# Patient Record
Sex: FEMALE | Race: WHITE | Employment: OTHER | URBAN - METROPOLITAN AREA
[De-identification: names, ages, dates, MRNs, and addresses within clinical notes are randomized per-mention and may not be internally consistent; named-entity substitution may affect disease eponyms.]

---

## 2017-06-01 ENCOUNTER — OFFICE VISIT (OUTPATIENT)
Dept: OPHTHALMOLOGY | Facility: CLINIC | Age: 72
End: 2017-06-01
Payer: COMMERCIAL

## 2017-06-01 DIAGNOSIS — H35.30 MACULAR DEGENERATION: ICD-10-CM

## 2017-06-01 DIAGNOSIS — D31.31 CHOROIDAL NEVUS OF RIGHT EYE: ICD-10-CM

## 2017-06-01 DIAGNOSIS — H26.9 CATARACT: Primary | ICD-10-CM

## 2017-06-01 DIAGNOSIS — Z98.890 HISTORY OF BLEPHAROPLASTY: ICD-10-CM

## 2017-06-01 DIAGNOSIS — H43.811 POSTERIOR VITREOUS DETACHMENT, RIGHT: ICD-10-CM

## 2017-06-01 DIAGNOSIS — H52.4 PRESBYOPIA: ICD-10-CM

## 2017-06-01 PROCEDURE — 92014 COMPRE OPH EXAM EST PT 1/>: CPT | Performed by: OPHTHALMOLOGY

## 2017-06-01 PROCEDURE — 92015 DETERMINE REFRACTIVE STATE: CPT | Performed by: OPHTHALMOLOGY

## 2017-06-01 ASSESSMENT — TONOMETRY
OS_IOP_MMHG: 15
IOP_METHOD: APPLANATION
OD_IOP_MMHG: 15

## 2017-06-01 ASSESSMENT — REFRACTION_WEARINGRX
OD_ADD: +2.75
OD_CYLINDER: +1.00
OS_AXIS: 150
OS_CYLINDER: +1.25
OD_SPHERE: -0.25
SPECS_TYPE: PAL
OS_ADD: +2.75
OD_AXIS: 180
OS_SPHERE: -0.50

## 2017-06-01 ASSESSMENT — REFRACTION_MANIFEST
OS_ADD: +3.00
OD_SPHERE: -0.25
OS_CYLINDER: +1.50
OS_AXIS: 155
OD_AXIS: 180
OD_CYLINDER: +1.00
OD_ADD: +3.00
OS_SPHERE: -0.50

## 2017-06-01 ASSESSMENT — CUP TO DISC RATIO
OD_RATIO: 0.4
OS_RATIO: 0.3

## 2017-06-01 ASSESSMENT — VISUAL ACUITY
OS_CC: 20/20
OS_CC: J1
METHOD: SNELLEN - LINEAR
OD_CC: J1
OD_CC: 20/20

## 2017-06-01 ASSESSMENT — CONF VISUAL FIELD
OD_NORMAL: 1
OS_NORMAL: 1

## 2017-06-01 ASSESSMENT — SLIT LAMP EXAM - LIDS
COMMENTS: GOOD CREASE, COSMESIS, 1+ MEIBOMIAN GLAND DYSFUNCTION
COMMENTS: GOOD CREASE, COSMESIS, 1+ MEIBOMIAN GLAND DYSFUNCTION

## 2017-06-01 ASSESSMENT — EXTERNAL EXAM - RIGHT EYE: OD_EXAM: NORMAL

## 2017-06-01 ASSESSMENT — EXTERNAL EXAM - LEFT EYE: OS_EXAM: NORMAL

## 2017-06-01 NOTE — PATIENT INSTRUCTIONS
"Glasses Rx given - optional  Possible posterior vitreous detachment (sudden onset large floater and/or flashing lights) left eye discussed.  Take a multiple vitamin or \"eye vitamin\" daily.  Protect your eyes outdoors from ultraviolet rays with sunglasses and/or brimmed hat.  Have spinach (cooked or raw), colorful fruits, walnuts, hazelnuts, almonds in your diet.  Monitor the vision in each eye weekly - call if any sudden persistent changes.  Call in February 2018 for an appointment in June 2018 for Complete Exam    Dr. Thao [phone]    "

## 2017-06-01 NOTE — MR AVS SNAPSHOT
"              After Visit Summary   6/1/2017    Edwardo RYAN    MRN: [id number]           Patient Information     Date Of Birth          1945        Visit Information        Provider Department      6/1/2017 1:30 PM Kee Thao MD [facility]        Today's Diagnoses     Presbyopia    -  1      Care Instructions    Glasses Rx given - optional  Possible posterior vitreous detachment (sudden onset large floater and/or flashing lights) left eye discussed.  Take a multiple vitamin or \"eye vitamin\" daily.  Protect your eyes outdoors from ultraviolet rays with sunglasses and/or brimmed hat.  Have spinach (cooked or raw), colorful fruits, walnuts, hazelnuts, almonds in your diet.  Monitor the vision in each eye weekly - call if any sudden persistent changes.  Call in February 2018 for an appointment in June 2018 for Complete Exam    Dr. Thao [phone]            Follow-ups after your visit        Who to contact     If you have questions or need follow up information about today's clinic visit or your schedule please contact [facility] directly at [phone].  Normal or non-critical lab and imaging results will be communicated to you by MyChart, letter or phone within 4 business days after the clinic has received the results. If you do not hear from us within 7 days, please contact the clinic through [employer] or phone. If you have a critical or abnormal lab result, we will notify you by phone as soon as possible.  Submit refill requests through [employer] or call your pharmacy and they will forward the refill request to us. Please allow 3 business days for your refill to be completed.          Additional Information About Your Visit        MyChart Information     [employer] lets you send messages to your doctor, view your test results, renew your prescriptions, schedule appointments and more. To sign up, go to [email or web address] . Click on \"Log in\" on the left side of " "the screen, which will take you to the Welcome page. Then click on \"Sign up Now\" on the right side of the page.     You will be asked to enter the access code listed below, as well as some personal information. Please follow the directions to create your username and password.     Your access code is: [email or web address]  Expires: 2017  3:00 PM     Your access code will  in 90 days. If you need help or a new code, please call your [place] clinic or [phone].        Care EveryWhere ID     This is your Care EveryWhere ID. This could be used by other organizations to access your [place] medical records  [id number]         Blood Pressure from Last 3 Encounters:   No data found for BP    Weight from Last 3 Encounters:   No data found for Wt              Today, you had the following     No orders found for display       Primary Care Provider Office Phone # Fax #    [facility] [phone]       [facility] [address]        Thank you!     Thank you for choosing [facility]  for your care. Our goal is always to provide you with excellent care. Hearing back from our patients is one way we can continue to improve our services. Please take a few minutes to complete the written survey that you may receive in the mail after your visit with us. Thank you!             Your Updated Medication List - Protect others around you: Learn how to safely use, store and throw away your medicines at www.disposemymeds.org.          This list is accurate as of: 17  3:00 PM.  Always use your most recent med list.                   Brand Name Dispense Instructions for use    aspirin 81 MG tablet      Take 1 tablet by mouth daily       calcium 500 +D 500-400 MG-UNIT Tabs   Generic drug:  Calcium Carb-Cholecalciferol      Take 1 tablet by mouth once       Fish Oil 500 MG Caps      Take 1 tablet by mouth once       MULTIPLE vitamin  S/WOMENS Tabs      Take 1 tablet by " mouth once       sulfacetamide-prednisoLONE 10-0.23 % ophthalmic solution    VASOCIDIN    5 mL    Place 1 drop into both eyes 2 times daily as needed

## 2017-06-01 NOTE — PROGRESS NOTES
" Current Eye Medications:  no     Subjective:  Comprehensive eye exam.   Pt reports she thinks her cataract may be getting worse in her right eye , cannot see street signs nearly as good in this eye as her left eye.    Gave names of DH and TS if desires switch to [facility].     Objective:  See Ophthalmology Exam.       Assessment:  Stable eye exam.      ICD-10-CM    1. Cataract, mild, ou H26.9 EYE EXAM (SIMPLE-NONBILLABLE)   2. 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 Macular degeneration, dry, mild, ou H35.30    4. History of blepharoplasty, upper lids, ou Z98.890    5. Posterior vitreous detachment, right H43.811    6. Presbyopia H52.4 REFRACTIVE STATUS        Plan: Glasses Rx given - optional  Possible posterior vitreous detachment (sudden onset large floater and/or flashing lights) left eye discussed.  Take a multiple vitamin or \"eye vitamin\" daily.  Protect your eyes outdoors from ultraviolet rays with sunglasses and/or brimmed hat.  Have spinach (cooked or raw), colorful fruits, walnuts, hazelnuts, almonds in your diet.  Monitor the vision in each eye weekly - call if any sudden persistent changes.  Call in February 2018 for an appointment in June 2018 for Complete Exam    Dr. Thao [phone]      "

## 2017-06-03 PROBLEM — H35.30 MACULAR DEGENERATION: Status: ACTIVE | Noted: 2017-06-03

## 2018-06-05 ENCOUNTER — OFFICE VISIT (OUTPATIENT)
Dept: OPHTHALMOLOGY | Facility: CLINIC | Age: 73
End: 2018-06-05
Payer: COMMERCIAL

## 2018-06-05 DIAGNOSIS — H52.4 PRESBYOPIA: ICD-10-CM

## 2018-06-05 DIAGNOSIS — H43.811 POSTERIOR VITREOUS DETACHMENT, RIGHT: ICD-10-CM

## 2018-06-05 DIAGNOSIS — Z83.518 FAMILY HISTORY OF MACULAR DEGENERATION: ICD-10-CM

## 2018-06-05 DIAGNOSIS — H35.30 MACULAR DEGENERATION: ICD-10-CM

## 2018-06-05 DIAGNOSIS — Z98.890 HISTORY OF BLEPHAROPLASTY: ICD-10-CM

## 2018-06-05 DIAGNOSIS — D31.31 CHOROIDAL NEVUS OF RIGHT EYE: ICD-10-CM

## 2018-06-05 DIAGNOSIS — H02.889 MGD (MEIBOMIAN GLAND DYSFUNCTION): ICD-10-CM

## 2018-06-05 DIAGNOSIS — H25.813 COMBINED FORMS OF AGE-RELATED CATARACT OF BOTH EYES: Primary | ICD-10-CM

## 2018-06-05 PROCEDURE — 92015 DETERMINE REFRACTIVE STATE: CPT | Performed by: OPHTHALMOLOGY

## 2018-06-05 PROCEDURE — 92014 COMPRE OPH EXAM EST PT 1/>: CPT | Performed by: OPHTHALMOLOGY

## 2018-06-05 RX ORDER — CLOBETASOL PROPIONATE 0.5 MG/G
OINTMENT TOPICAL 2 TIMES DAILY
COMMUNITY
End: 2023-11-15

## 2018-06-05 ASSESSMENT — REFRACTION_WEARINGRX
OD_CYLINDER: +1.00
OS_AXIS: 160
OS_ADD: +3.00
OS_SPHERE: -0.50
OS_CYLINDER: +1.50
OD_SPHERE: -0.25
OD_ADD: +3.00
OD_AXIS: 003
SPECS_TYPE: PAL

## 2018-06-05 ASSESSMENT — CONF VISUAL FIELD
OD_NORMAL: 1
OS_NORMAL: 1

## 2018-06-05 ASSESSMENT — CUP TO DISC RATIO
OD_RATIO: 0.4
OS_RATIO: 0.3

## 2018-06-05 ASSESSMENT — REFRACTION_MANIFEST
OS_SPHERE: -0.25
OS_ADD: +3.00
OD_AXIS: 003
OD_CYLINDER: +1.25
OS_AXIS: 153
OD_ADD: +3.00
OS_CYLINDER: +1.00
OD_SPHERE: PLANO

## 2018-06-05 ASSESSMENT — VISUAL ACUITY
OD_CC: 8
OS_CC+: -1
OS_PH_CC: 40-1
OD_PH_CC: 20/40
OS_CC: 4
OD_CC+: -1
OS_CC: 20/50
METHOD: SNELLEN - LINEAR
CORRECTION_TYPE: GLASSES
OD_CC: 20/50

## 2018-06-05 ASSESSMENT — TONOMETRY
IOP_METHOD: APPLANATION
OS_IOP_MMHG: 14
OD_IOP_MMHG: 14

## 2018-06-05 ASSESSMENT — EXTERNAL EXAM - RIGHT EYE: OD_EXAM: NORMAL

## 2018-06-05 ASSESSMENT — EXTERNAL EXAM - LEFT EYE: OS_EXAM: NORMAL

## 2018-06-05 NOTE — PROGRESS NOTES
" Current Eye Medications:  None.      Subjective:  Comprehensive Eye Exam.  She feels her vision in her right eye is decreasing compared to her left eye.  When driving, she is unable to see the road signs with her right eye.       Objective:  See Ophthalmology Exam.       Assessment:  Mild worsening of cataract right eye.  Stable mild dry macular changes both eyes.      ICD-10-CM    1. Combined forms of age-related cataract, mild, of both eyes H25.813 EYE EXAM (SIMPLE-NONBILLABLE)   2. Macular degeneration, dry, mild, ou H35.30    3. MGD (meibomian gland dysfunction) H02.89    4. 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 Family history of macular degeneration Z83.518    6. History of blepharoplasty, upper lids, ou Z98.890    7. Posterior vitreous detachment, right H43.811    8. Presbyopia H52.4 REFRACTIVE STATUS        Plan:  Glasses Rx given - optional.  Take a multiple vitamin or \"eye vitamin\" daily.  Alternate every other day with one AREDS 2.  Protect your eyes outdoors from ultraviolet rays with sunglasses and/or brimmed hat.  Have spinach (cooked or raw), colorful fruits, walnuts, hazelnuts, almonds in your diet.  Monitor the vision in each eye weekly - call if any sudden persistent changes.  Possible posterior vitreous detachment (sudden onset large floater and/or flashing lights) left eye discussed.  Call in February 2019 for an appointment in June 2019 for Complete Exam    Dr. Thao [phone]             "

## 2018-06-05 NOTE — MR AVS SNAPSHOT
"              After Visit Summary   6/5/2018    Edwardo RYAN    MRN: [id number]           Patient Information     Date Of Birth          1945        Visit Information        Provider Department      6/5/2018 1:00 PM Kee Thao [facility]        Today's Diagnoses     Presbyopia    -  1      Care Instructions    Glasses Rx given - optional.  Take a multiple vitamin or \"eye vitamin\" daily.  Alternate every other day with one AREDS 2.  Protect your eyes outdoors from ultraviolet rays with sunglasses and/or brimmed hat.  Have spinach (cooked or raw), colorful fruits, walnuts, hazelnuts, almonds in your diet.  Monitor the vision in each eye weekly - call if any sudden persistent changes.  Possible posterior vitreous detachment (sudden onset large floater and/or flashing lights) left eye discussed.  Call in February 2019 for an appointment in June 2019 for Complete Exam    Dr. Thao [phone]    Kee Thao M.D.  [phone]            Follow-ups after your visit        Who to contact     If you have questions or need follow up information about today's clinic visit or your schedule please contact [facility] directly at [phone].  Normal or non-critical lab and imaging results will be communicated to you by MyChart, letter or phone within 4 business days after the clinic has received the results. If you do not hear from us within 7 days, please contact the clinic through MyChart or phone. If you have a critical or abnormal lab result, we will notify you by phone as soon as possible.  Submit refill requests through [employer] or call your pharmacy and they will forward the refill request to us. Please allow 3 business days for your refill to be completed.          Additional Information About Your Visit        Care EveryWhere ID     This is your Care EveryWhere ID. This could be used by other organizations to access your [place] medical " records  [id number]         Blood Pressure from Last 3 Encounters:   No data found for BP    Weight from Last 3 Encounters:   No data found for Wt              Today, you had the following     No orders found for display       Primary Care Provider Office Phone # Fax #    Joanna Gamboa [phone]       [facility] [address] [place] [address]        Equal Access to Services     [facility]: Hadii aad ku hadasho Soomaali, waaxda luqadaha, qaybta kaalmada adeegyada, waxay idiin hayaan adeeg kharash la'aan ah. So [facility] [phone].    ATENCIÓN: Si habla espedouard, tiene a markham disposición servicios gratuitos de asistencia lingüística. Llame al [phone].    We comply with applicable federal civil rights laws and [place] laws. We do not discriminate on the basis of race, color, national origin, age, disability, sex, sexual orientation, or gender identity.            Thank you!     Thank you for choosing [facility]  for your care. Our goal is always to provide you with excellent care. Hearing back from our patients is one way we can continue to improve our services. Please take a few minutes to complete the written survey that you may receive in the mail after your visit with us. Thank you!             Your Updated Medication List - Protect others around you: Learn how to safely use, store and throw away your medicines at www.disposemymeds.org.          This list is accurate as of 6/5/18  2:24 PM.  Always use your most recent med list.                   Brand Name Dispense Instructions for use Diagnosis    clobetasol 0.05 % ointment    TEMOVATE     Apply topically 2 times daily        Fish Oil 500 MG Caps      Take 1 tablet by mouth once        MULTIPLE vitamin  S/WOMENS Tabs      Take 1 tablet by mouth once

## 2018-06-05 NOTE — LETTER
"    6/5/2018         RE: Edwardo RYAN  [address] [facility] [address]        Dear Colleague,    Thank you for referring your patient, Edwardo RYAN, to the [facility]. Please see a copy of my visit note below.     Current Eye Medications:  None.      Subjective:  Comprehensive Eye Exam.  She feels her vision in her right eye is decreasing compared to her left eye.  When driving, she is unable to see the road signs with her right eye.       Objective:  See Ophthalmology Exam.       Assessment:  Mild worsening of cataract right eye.  Stable mild dry macular changes both eyes.      ICD-10-CM    1. Combined forms of age-related cataract, mild, of both eyes H25.813 EYE EXAM (SIMPLE-NONBILLABLE)   2. Macular degeneration, dry, mild, ou H35.30    3. MGD (meibomian gland dysfunction) H02.89    4. 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 Family history of macular degeneration Z83.518    6. History of blepharoplasty, upper lids, ou Z98.890    7. Posterior vitreous detachment, right H43.811    8. Presbyopia H52.4 REFRACTIVE STATUS        Plan:  Glasses Rx given - optional.  Take a multiple vitamin or \"eye vitamin\" daily.  Alternate every other day with one AREDS 2.  Protect your eyes outdoors from ultraviolet rays with sunglasses and/or brimmed hat.  Have spinach (cooked or raw), colorful fruits, walnuts, hazelnuts, almonds in your diet.  Monitor the vision in each eye weekly - call if any sudden persistent changes.  Possible posterior vitreous detachment (sudden onset large floater and/or flashing lights) left eye discussed.  Call in February 2019 for an appointment in June 2019 for Complete Exam    Dr. Thao [phone]               Again, thank you for allowing me to participate in the care of your patient.        Sincerely,        Kee Thao MD    "

## 2019-02-21 ENCOUNTER — TELEPHONE (OUTPATIENT)
Dept: OPHTHALMOLOGY | Facility: CLINIC | Age: 74
End: 2019-02-21

## 2019-02-21 NOTE — TELEPHONE ENCOUNTER
Reason for Call:  Other recommendation    Detailed comments: Patient called and scheduled her CE in June but states that her right eye is getting blurry and she will be driving to [place] in march and is wondering if there is something that can be done. Please call to discuss thank you.     Phone Number Patient can be reached at: Home number on file [phone] (home) or Cell number on file:    Telephone Information:   Mobile [phone]       Best Time: any    Can we leave a detailed message on this number? YES    Call taken on 2/21/2019 at 10:32 AM by ALICIA MONTES

## 2019-02-21 NOTE — TELEPHONE ENCOUNTER
Spoke to patient and she is having blurred vision in the right eye for a few months now.  She is not having any flashes or floaters, but knows she has a cataract in the right eye.  She would like to be seen sooner than later due to driving to [place] soon and doesn't feel comfortable when she is blurry.  Will bring her in next Wed at 10:45 am for a dilated right eye check up. Told her we can just dilate the right eye and not the left due to her longer drive.  She will call if there are any other immediate problems, but comfortable with waiting until next week.

## 2019-02-27 ENCOUNTER — OFFICE VISIT (OUTPATIENT)
Dept: OPHTHALMOLOGY | Facility: CLINIC | Age: 74
End: 2019-02-27
Payer: MEDICARE

## 2019-02-27 DIAGNOSIS — D31.31 CHOROIDAL NEVUS OF RIGHT EYE: ICD-10-CM

## 2019-02-27 DIAGNOSIS — Z98.890 HISTORY OF BLEPHAROPLASTY: ICD-10-CM

## 2019-02-27 DIAGNOSIS — H35.3131 EARLY DRY STAGE NONEXUDATIVE AGE-RELATED MACULAR DEGENERATION OF BOTH EYES: ICD-10-CM

## 2019-02-27 DIAGNOSIS — H25.813 COMBINED FORMS OF AGE-RELATED CATARACT OF BOTH EYES: Primary | ICD-10-CM

## 2019-02-27 DIAGNOSIS — H43.811 POSTERIOR VITREOUS DETACHMENT, RIGHT: ICD-10-CM

## 2019-02-27 DIAGNOSIS — H02.889 MGD (MEIBOMIAN GLAND DYSFUNCTION): ICD-10-CM

## 2019-02-27 PROCEDURE — 92134 CPTRZ OPH DX IMG PST SGM RTA: CPT | Performed by: OPHTHALMOLOGY

## 2019-02-27 PROCEDURE — 92014 COMPRE OPH EXAM EST PT 1/>: CPT | Performed by: OPHTHALMOLOGY

## 2019-02-27 ASSESSMENT — VISUAL ACUITY
OS_CC: 20/25+2
OD_CC: 20/25-1
OD_BAT_HIGH: 20/40+1
OS_BAT_HIGH: 20/25
CORRECTION_TYPE: GLASSES
METHOD: SNELLEN - LINEAR

## 2019-02-27 ASSESSMENT — EXTERNAL EXAM - LEFT EYE: OS_EXAM: NORMAL

## 2019-02-27 ASSESSMENT — TONOMETRY
OS_IOP_MMHG: 18
IOP_METHOD: APPLANATION
OD_IOP_MMHG: 18

## 2019-02-27 ASSESSMENT — CUP TO DISC RATIO: OD_RATIO: 0.4

## 2019-02-27 ASSESSMENT — EXTERNAL EXAM - RIGHT EYE: OD_EXAM: NORMAL

## 2019-02-27 NOTE — PATIENT INSTRUCTIONS
"Take a multiple vitamin or \"eye vitamin\" daily (AREDS2).  Protect your eyes outdoors from ultraviolet rays with sunglasses and/or brimmed hat.  Have spinach (cooked or raw), colorful fruits, walnuts, hazelnuts, almonds in your diet.  Monitor the vision in each eye weekly - call if any sudden persistent changes.   Return visit as planned.    Kee Thao M.D.  [phone]    "

## 2019-02-27 NOTE — PROGRESS NOTES
" Current Eye Medications:  no     Subjective:  Decrease vision right eye   Pt reports she has noticed a gradual decline in her vision particularly in her right eye since saw us 6 mos ago.     Objective:  See Ophthalmology Exam.       Assessment:  Vision, cataract, and dry age related maculopathy appear stable.      ICD-10-CM    1. Combined forms of age-related cataract, mild, of both eyes H25.813    2. Early dry stage nonexudative age-related macular degeneration of both eyes H35.3131  COMPUTERIZED OPHTHALMIC IMAGING RETINA   3. 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 History of blepharoplasty, upper lids, ou Z98.890    5. MGD (meibomian gland dysfunction) H02.889    6. Posterior vitreous detachment, right H43.811         Plan:  Take a multiple vitamin or \"eye vitamin\" daily (AREDS2).  Protect your eyes outdoors from ultraviolet rays with sunglasses and/or brimmed hat.  Have spinach (cooked or raw), colorful fruits, walnuts, hazelnuts, almonds in your diet.  Monitor the vision in each eye weekly - call if any sudden persistent changes.   Return visit as planned.    Kee Thao M.D.  [phone]       "

## 2019-02-27 NOTE — LETTER
"    2/27/2019         RE: Edwardo RYAN  [address] [facility] [address]        Dear Colleague,    Thank you for referring your patient, Edwardo RYAN, to the [facility]. Please see a copy of my visit note below.     Current Eye Medications:  no     Subjective:  Decrease vision right eye   Pt reports she has noticed a gradual decline in her vision particularly in her right eye since saw us 6 mos ago.     Objective:  See Ophthalmology Exam.       Assessment:  Vision, cataract, and dry age related maculopathy appear stable.      ICD-10-CM    1. Combined forms of age-related cataract, mild, of both eyes H25.813    2. Early dry stage nonexudative age-related macular degeneration of both eyes H35.3131  COMPUTERIZED OPHTHALMIC IMAGING RETINA   3. 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 History of blepharoplasty, upper lids, ou Z98.890    5. MGD (meibomian gland dysfunction) H02.889    6. Posterior vitreous detachment, right H43.811         Plan:  Take a multiple vitamin or \"eye vitamin\" daily (AREDS2).  Protect your eyes outdoors from ultraviolet rays with sunglasses and/or brimmed hat.  Have spinach (cooked or raw), colorful fruits, walnuts, hazelnuts, almonds in your diet.  Monitor the vision in each eye weekly - call if any sudden persistent changes.   Return visit as planned.    Kee Thao M.D.  [phone]           Again, thank you for allowing me to participate in the care of your patient.        Sincerely,        Kee Thao MD    "

## 2019-02-28 PROBLEM — H35.3131 EARLY DRY STAGE NONEXUDATIVE AGE-RELATED MACULAR DEGENERATION OF BOTH EYES: Status: ACTIVE | Noted: 2019-02-28

## 2019-04-17 ENCOUNTER — DOCUMENTATION ONLY (OUTPATIENT)
Dept: OPHTHALMOLOGY | Facility: CLINIC | Age: 74
End: 2019-04-17

## 2019-06-11 ENCOUNTER — OFFICE VISIT (OUTPATIENT)
Dept: OPHTHALMOLOGY | Facility: CLINIC | Age: 74
End: 2019-06-11
Payer: MEDICARE

## 2019-06-11 DIAGNOSIS — D31.31 CHOROIDAL NEVUS OF RIGHT EYE: ICD-10-CM

## 2019-06-11 DIAGNOSIS — H02.889 MGD (MEIBOMIAN GLAND DYSFUNCTION): ICD-10-CM

## 2019-06-11 DIAGNOSIS — H52.4 PRESBYOPIA: ICD-10-CM

## 2019-06-11 DIAGNOSIS — H25.813 COMBINED FORMS OF AGE-RELATED CATARACT OF BOTH EYES: Primary | ICD-10-CM

## 2019-06-11 DIAGNOSIS — H43.811 POSTERIOR VITREOUS DETACHMENT, RIGHT: ICD-10-CM

## 2019-06-11 DIAGNOSIS — H35.3131 EARLY DRY STAGE NONEXUDATIVE AGE-RELATED MACULAR DEGENERATION OF BOTH EYES: ICD-10-CM

## 2019-06-11 DIAGNOSIS — Z98.890 HISTORY OF BLEPHAROPLASTY: ICD-10-CM

## 2019-06-11 PROCEDURE — 92015 DETERMINE REFRACTIVE STATE: CPT | Mod: GY | Performed by: OPHTHALMOLOGY

## 2019-06-11 PROCEDURE — 92014 COMPRE OPH EXAM EST PT 1/>: CPT | Performed by: OPHTHALMOLOGY

## 2019-06-11 ASSESSMENT — CUP TO DISC RATIO
OS_RATIO: 0.3
OD_RATIO: 0.4

## 2019-06-11 ASSESSMENT — REFRACTION_MANIFEST
OD_SPHERE: PLANO
OD_CYLINDER: +1.25
OS_ADD: +3.00
OD_ADD: +3.00
OS_SPHERE: PLANO
OS_CYLINDER: +1.25
OD_AXIS: 005
OS_AXIS: 167

## 2019-06-11 ASSESSMENT — TONOMETRY
IOP_METHOD: APPLANATION
OD_IOP_MMHG: 15
OS_IOP_MMHG: 12

## 2019-06-11 ASSESSMENT — VISUAL ACUITY
OD_CC: J1
OS_CC: J1
OD_CC: 20/20-1
CORRECTION_TYPE: GLASSES
OS_CC: 20/25+3
METHOD: SNELLEN - LINEAR

## 2019-06-11 ASSESSMENT — REFRACTION_WEARINGRX
OD_CYLINDER: +1.00
OD_AXIS: 003
OS_AXIS: 150
SPECS_TYPE: PAL
OS_ADD: +3.00
OD_SPHERE: PLANO
OS_CYLINDER: +1.25
OS_SPHERE: -0.25
OD_ADD: +3.00

## 2019-06-11 ASSESSMENT — CONF VISUAL FIELD
OD_NORMAL: 1
OS_NORMAL: 1

## 2019-06-11 ASSESSMENT — EXTERNAL EXAM - RIGHT EYE: OD_EXAM: NORMAL

## 2019-06-11 ASSESSMENT — EXTERNAL EXAM - LEFT EYE: OS_EXAM: NORMAL

## 2019-06-11 NOTE — LETTER
"    6/11/2019         RE: Edwardo RYAN  [address] [facility] [address]        Dear Colleague,    Thank you for referring your patient, Edwardo RYAN, to the [facility]. Please see a copy of my visit note below.     Current Eye Medications:  no     Subjective:  Comprehensive eye exam.   Patient reports is seeing well, vision seems unchanged and states eyes seem otherwise fine.      Objective:  See Ophthalmology Exam.       Assessment:  Stable eye exam.      ICD-10-CM    1. Combined forms of age-related cataract, mild, of both eyes H25.813    2. Early dry stage nonexudative age-related macular degeneration of both eyes H35.3131    3. MGD (meibomian gland dysfunction) H02.889    4. 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 History of blepharoplasty, upper lids, ou Z98.890    6. Posterior vitreous detachment, right H43.811    7. Presbyopia H52.4 REFRACTIVE STATUS     EYE EXAM (SIMPLE-NONBILLABLE)        Plan:  Possible posterior vitreous detachment (sudden onset large floater and/or flashing lights) left eye discussed.  Take a multiple vitamin or \"eye vitamin\" daily (AREDS2).  Protect your eyes outdoors from ultraviolet rays with sunglasses and/or brimmed hat.  Have spinach (cooked or raw), colorful fruits, walnuts, hazelnuts, almonds in your diet.  Monitor the vision in each eye weekly - call if any sudden persistent changes.   Glasses Rx given - optional   Call in February 2019 for an appointment in June 2020 for Complete Exam.    Dr. Thao [phone]         Again, thank you for allowing me to participate in the care of your patient.        Sincerely,        Kee Thao MD    "

## 2019-06-11 NOTE — PROGRESS NOTES
" Current Eye Medications:  no     Subjective:  Comprehensive eye exam.   Patient reports is seeing well, vision seems unchanged and states eyes seem otherwise fine.      Objective:  See Ophthalmology Exam.       Assessment:  Stable eye exam.      ICD-10-CM    1. Combined forms of age-related cataract, mild, of both eyes H25.813    2. Early dry stage nonexudative age-related macular degeneration of both eyes H35.3131    3. MGD (meibomian gland dysfunction) H02.889    4. 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 History of blepharoplasty, upper lids, ou Z98.890    6. Posterior vitreous detachment, right H43.811    7. Presbyopia H52.4 REFRACTIVE STATUS     EYE EXAM (SIMPLE-NONBILLABLE)        Plan:  Possible posterior vitreous detachment (sudden onset large floater and/or flashing lights) left eye discussed.  Take a multiple vitamin or \"eye vitamin\" daily (AREDS2).  Protect your eyes outdoors from ultraviolet rays with sunglasses and/or brimmed hat.  Have spinach (cooked or raw), colorful fruits, walnuts, hazelnuts, almonds in your diet.  Monitor the vision in each eye weekly - call if any sudden persistent changes.   Glasses Rx given - optional   Call in February 2019 for an appointment in June 2020 for Complete Exam.    Dr. Thao [phone]       "

## 2019-06-11 NOTE — PATIENT INSTRUCTIONS
"Possible posterior vitreous detachment (sudden onset large floater and/or flashing lights) left eye discussed.  Take a multiple vitamin or \"eye vitamin\" daily (AREDS2).  Protect your eyes outdoors from ultraviolet rays with sunglasses and/or brimmed hat.  Have spinach (cooked or raw), colorful fruits, walnuts, hazelnuts, almonds in your diet.  Monitor the vision in each eye weekly - call if any sudden persistent changes.   Glasses Rx given - optional   Call in February 2019 for an appointment in June 2020 for Complete Exam.    Dr. Thao [phone]  "

## 2020-01-23 ENCOUNTER — TELEPHONE (OUTPATIENT)
Dept: OPHTHALMOLOGY | Facility: CLINIC | Age: 75
End: 2020-01-23

## 2020-01-23 NOTE — TELEPHONE ENCOUNTER
Pt called back and  She stated she is now in [place] and  Reports for the last week that she has had this ache almost like a headache feeling behind her right eye for about a week , is not there all the time , however  Frequently has it and can have it for  An  Entire day. She asked if this problem should be a concern.I told her  really no really to tell without examination.I told he could be nothing serious or could be a problem I told pt since it has been  Going on for this much time she need to go to an eye care provider where she it at to have this problem  Checked out. Pt thought this was a good idea and said she will make an appt today.

## 2020-01-23 NOTE — TELEPHONE ENCOUNTER
Patient is currently in [place]. She is having a dull ache on the back of her right eye. She would like a return phone call to discuss. [phone].

## 2020-06-29 ENCOUNTER — OFFICE VISIT (OUTPATIENT)
Dept: OPHTHALMOLOGY | Facility: CLINIC | Age: 75
End: 2020-06-29
Payer: MEDICARE

## 2020-06-29 DIAGNOSIS — H25.813 COMBINED FORMS OF AGE-RELATED CATARACT OF BOTH EYES: Primary | ICD-10-CM

## 2020-06-29 DIAGNOSIS — Z83.518 FAMILY HISTORY OF MACULAR DEGENERATION: ICD-10-CM

## 2020-06-29 DIAGNOSIS — Z01.00 EXAMINATION OF EYES AND VISION: ICD-10-CM

## 2020-06-29 DIAGNOSIS — H35.3131 EARLY DRY STAGE NONEXUDATIVE AGE-RELATED MACULAR DEGENERATION OF BOTH EYES: ICD-10-CM

## 2020-06-29 DIAGNOSIS — H52.4 PRESBYOPIA: ICD-10-CM

## 2020-06-29 DIAGNOSIS — H43.811 POSTERIOR VITREOUS DETACHMENT, RIGHT: ICD-10-CM

## 2020-06-29 DIAGNOSIS — D31.31 CHOROIDAL NEVUS OF RIGHT EYE: ICD-10-CM

## 2020-06-29 DIAGNOSIS — Z98.890 HISTORY OF BLEPHAROPLASTY: ICD-10-CM

## 2020-06-29 PROCEDURE — 92014 COMPRE OPH EXAM EST PT 1/>: CPT | Performed by: OPHTHALMOLOGY

## 2020-06-29 PROCEDURE — 92015 DETERMINE REFRACTIVE STATE: CPT | Mod: GY | Performed by: OPHTHALMOLOGY

## 2020-06-29 ASSESSMENT — REFRACTION_WEARINGRX
OS_SPHERE: PLANO
OS_ADD: +3.00
OD_SPHERE: PLANO
OD_CYLINDER: +1.50
SPECS_TYPE: PAL
OD_ADD: +3.00
OD_AXIS: 020
OS_AXIS: 170
OS_CYLINDER: +1.50

## 2020-06-29 ASSESSMENT — REFRACTION_MANIFEST
OS_CYLINDER: +1.75
OD_SPHERE: PLANO
OS_AXIS: 160
OD_AXIS: 007
OS_SPHERE: -0.25
OS_ADD: +3.00
OD_CYLINDER: +1.25
OD_ADD: +3.00

## 2020-06-29 ASSESSMENT — TONOMETRY
IOP_METHOD: APPLANATION
OD_IOP_MMHG: 18
OS_IOP_MMHG: 18

## 2020-06-29 ASSESSMENT — VISUAL ACUITY
OS_CC: J2+
CORRECTION_TYPE: GLASSES, CONTACTS
OD_CC: 20/30+3
OD_CC: J1
OS_CC: 20/30+3
METHOD: SNELLEN - LINEAR

## 2020-06-29 ASSESSMENT — CONF VISUAL FIELD
OS_NORMAL: 1
OD_NORMAL: 1

## 2020-06-29 ASSESSMENT — CUP TO DISC RATIO
OS_RATIO: 0.3
OD_RATIO: 0.4

## 2020-06-29 ASSESSMENT — EXTERNAL EXAM - RIGHT EYE: OD_EXAM: NORMAL

## 2020-06-29 ASSESSMENT — EXTERNAL EXAM - LEFT EYE: OS_EXAM: NORMAL

## 2020-06-29 NOTE — PATIENT INSTRUCTIONS
"Possible posterior vitreous detachment (sudden onset large floater and/or flashing lights) left eye discussed.  Take a multiple vitamin or \"eye vitamin\" daily (AREDS2).  Protect your eyes outdoors from ultraviolet rays with sunglasses and/or brimmed hat.  Have spinach (cooked or raw), colorful fruits, walnuts, hazelnuts, almonds in your diet.  Monitor the vision in each eye weekly - call if any sudden persistent changes.   Glasses Rx given - optional   Call in February 2021 for an appointment in June 2021 for Complete Exam.     Dr. Thao [phone]  "

## 2020-06-29 NOTE — LETTER
"    6/29/2020         RE: Edwardo RYAN  [address] [facility] [address]        Dear Colleague,    Thank you for referring your patient, Edwardo RYAN, to the [facility]. Please see a copy of my visit note below.     Current Eye Medications:  Alternating  MVI with eye vit.     Subjective:  Comprehensive eye exam.   Pt reports it is getting a little harder to see, particularly at near.    Intermittent aching right eye.  Also, has right ear pain; will be seeing ENT     Objective:  See Ophthalmology Exam.       Assessment:  No obvious cause for aching right eye.  Otherwise stable eye exam.      ICD-10-CM    1. Combined forms of age-related cataract, mild, of both eyes  H25.813 EYE EXAM (SIMPLE-NONBILLABLE)   2. Early dry stage nonexudative age-related macular degeneration of both eyes  H35.3131    3. Choroidal nevus of right eye  D31.31    4. History of blepharoplasty, upper lids, ou  Z98.890    5. Family history of macular degeneration  Z83.518    6. Posterior vitreous detachment, right  H43.811    7. Examination of eyes and vision  Z01.00 REFRACTION     EYE EXAM (SIMPLE-NONBILLABLE)   8. Presbyopia  H52.4 REFRACTION        Plan:  Possible posterior vitreous detachment (sudden onset large floater and/or flashing lights) left eye discussed.  Take a multiple vitamin or \"eye vitamin\" daily (AREDS2).  Protect your eyes outdoors from ultraviolet rays with sunglasses and/or brimmed hat.  Have spinach (cooked or raw), colorful fruits, walnuts, hazelnuts, almonds in your diet.  Monitor the vision in each eye weekly - call if any sudden persistent changes.   Glasses Rx given - optional   Call in February 2021 for an appointment in June 2021 for Complete Exam.     Dr. Thao [phone]       Again, thank you for allowing me to participate in the care of your patient.        Sincerely,        Kee Thao MD    "

## 2020-06-29 NOTE — PROGRESS NOTES
" Current Eye Medications:  Alternating  MVI with eye vit.     Subjective:  Comprehensive eye exam.   Pt reports it is getting a little harder to see, particularly at near.    Intermittent aching right eye.  Also, has right ear pain; will be seeing ENT     Objective:  See Ophthalmology Exam.       Assessment:  No obvious cause for aching right eye.  Otherwise stable eye exam.      ICD-10-CM    1. Combined forms of age-related cataract, mild, of both eyes  H25.813 EYE EXAM (SIMPLE-NONBILLABLE)   2. Early dry stage nonexudative age-related macular degeneration of both eyes  H35.3131    3. Choroidal nevus of right eye  D31.31    4. History of blepharoplasty, upper lids, ou  Z98.890    5. Family history of macular degeneration  Z83.518    6. Posterior vitreous detachment, right  H43.811    7. Examination of eyes and vision  Z01.00 REFRACTION     EYE EXAM (SIMPLE-NONBILLABLE)   8. Presbyopia  H52.4 REFRACTION        Plan:  Possible posterior vitreous detachment (sudden onset large floater and/or flashing lights) left eye discussed.  Take a multiple vitamin or \"eye vitamin\" daily (AREDS2).  Protect your eyes outdoors from ultraviolet rays with sunglasses and/or brimmed hat.  Have spinach (cooked or raw), colorful fruits, walnuts, hazelnuts, almonds in your diet.  Monitor the vision in each eye weekly - call if any sudden persistent changes.   Glasses Rx given - optional   Call in February 2021 for an appointment in June 2021 for Complete Exam.     Dr. Thao [phone]     "

## 2021-05-27 ENCOUNTER — OFFICE VISIT (OUTPATIENT)
Dept: OPHTHALMOLOGY | Facility: CLINIC | Age: 76
End: 2021-05-27
Payer: MEDICARE

## 2021-05-27 DIAGNOSIS — H43.813 POSTERIOR VITREOUS DETACHMENT OF BOTH EYES: ICD-10-CM

## 2021-05-27 DIAGNOSIS — H43.812 POSTERIOR VITREOUS DETACHMENT OF LEFT EYE: Primary | ICD-10-CM

## 2021-05-27 PROCEDURE — 92012 INTRM OPH EXAM EST PATIENT: CPT | Performed by: OPHTHALMOLOGY

## 2021-05-27 ASSESSMENT — TONOMETRY
OD_IOP_MMHG: 19
IOP_METHOD: APPLANATION
OS_IOP_MMHG: 19

## 2021-05-27 ASSESSMENT — VISUAL ACUITY
OD_CC: 20/25
OD_CC+: -1
CORRECTION_TYPE: GLASSES
METHOD: SNELLEN - LINEAR
OS_CC: 20/25

## 2021-05-27 ASSESSMENT — EXTERNAL EXAM - RIGHT EYE: OD_EXAM: NORMAL

## 2021-05-27 ASSESSMENT — CUP TO DISC RATIO: OS_RATIO: 0.3

## 2021-05-27 ASSESSMENT — EXTERNAL EXAM - LEFT EYE: OS_EXAM: NORMAL

## 2021-05-27 NOTE — PATIENT INSTRUCTIONS
Signs and symptoms of retinal detachment (shower of black floaters, frequent flashing even during day, curtain over part of visual field) discussed.  Return visit as planned.  Kee Thao M.D.  [phone]

## 2021-05-27 NOTE — PROGRESS NOTES
Current Eye Medications:  Preservision every other day      Subjective:  New black stringy floater started last night in left eye across top of vision. Has history floaters right eye, but the one in left eye is different. Vision is little cloudy right eye > left eye, cataracts is getting ready. Hasn't noticed any change of vision since floaters. No flashes, or curtain effect. No eye pain or discomfort in either eye.      Objective:  See Ophthalmology Exam.       Assessment:  Probable posterior vitreous detachment left eye.      Plan:  Signs and symptoms of retinal detachment (shower of black floaters, frequent flashing even during day, curtain over part of visual field) discussed.  Return visit as planned.  Kee Thao M.D.  [phone]

## 2021-05-27 NOTE — LETTER
5/27/2021         RE: Edwardo RYAN  [address] [facility] [address]        Dear Colleague,    Thank you for referring your patient, Edwardo RYAN, to the [facility]. Please see a copy of my visit note below.     Current Eye Medications:  Preservision every other day      Subjective:  New black stringy floater started last night in left eye across top of vision. Has history floaters right eye, but the one in left eye is different. Vision is little cloudy right eye > left eye, cataracts is getting ready. Hasn't noticed any change of vision since floaters. No flashes, or curtain effect. No eye pain or discomfort in either eye.      Objective:  See Ophthalmology Exam.       Assessment:  Probable posterior vitreous detachment left eye.      Plan:  Signs and symptoms of retinal detachment (shower of black floaters, frequent flashing even during day, curtain over part of visual field) discussed.  Return visit as planned.  Kee Thao M.D.  [phone]                 Again, thank you for allowing me to participate in the care of your patient.        Sincerely,        Kee Thao MD

## 2021-05-28 PROBLEM — H43.813 POSTERIOR VITREOUS DETACHMENT OF BOTH EYES: Status: ACTIVE | Noted: 2021-05-28

## 2021-06-30 ENCOUNTER — OFFICE VISIT (OUTPATIENT)
Dept: OPHTHALMOLOGY | Facility: CLINIC | Age: 76
End: 2021-06-30
Payer: MEDICARE

## 2021-06-30 DIAGNOSIS — H52.4 PRESBYOPIA: ICD-10-CM

## 2021-06-30 DIAGNOSIS — Z98.890 HISTORY OF BLEPHAROPLASTY: ICD-10-CM

## 2021-06-30 DIAGNOSIS — H43.813 POSTERIOR VITREOUS DETACHMENT OF BOTH EYES: ICD-10-CM

## 2021-06-30 DIAGNOSIS — H02.889 MGD (MEIBOMIAN GLAND DYSFUNCTION): ICD-10-CM

## 2021-06-30 DIAGNOSIS — H35.3131 EARLY DRY STAGE NONEXUDATIVE AGE-RELATED MACULAR DEGENERATION OF BOTH EYES: ICD-10-CM

## 2021-06-30 DIAGNOSIS — H25.813 COMBINED FORMS OF AGE-RELATED CATARACT OF BOTH EYES: Primary | ICD-10-CM

## 2021-06-30 DIAGNOSIS — D31.31 CHOROIDAL NEVUS OF RIGHT EYE: ICD-10-CM

## 2021-06-30 DIAGNOSIS — Z01.01 ENCOUNTER FOR EXAMINATION OF EYES AND VISION WITH ABNORMAL FINDINGS: ICD-10-CM

## 2021-06-30 PROBLEM — M25.512 CHRONIC LEFT SHOULDER PAIN: Status: ACTIVE | Noted: 2017-06-13

## 2021-06-30 PROBLEM — Z96.611 S/P REVERSE TOTAL SHOULDER ARTHROPLASTY, RIGHT: Status: ACTIVE | Noted: 2018-08-17

## 2021-06-30 PROBLEM — G89.29 CHRONIC LEFT SHOULDER PAIN: Status: ACTIVE | Noted: 2017-06-13

## 2021-06-30 PROBLEM — K63.5 HYPERPLASTIC COLON POLYP: Status: ACTIVE | Noted: 2021-04-20

## 2021-06-30 PROBLEM — M12.811 ROTATOR CUFF ARTHROPATHY OF RIGHT SHOULDER: Status: ACTIVE | Noted: 2017-03-02

## 2021-06-30 PROCEDURE — 92014 COMPRE OPH EXAM EST PT 1/>: CPT | Performed by: OPHTHALMOLOGY

## 2021-06-30 PROCEDURE — 92015 DETERMINE REFRACTIVE STATE: CPT | Mod: GY | Performed by: OPHTHALMOLOGY

## 2021-06-30 RX ORDER — TRIAMCINOLONE ACETONIDE 1 MG/G
OINTMENT TOPICAL
COMMUNITY
Start: 2020-10-21

## 2021-06-30 RX ORDER — CEPHALEXIN 500 MG/1
500 CAPSULE ORAL
COMMUNITY
Start: 2021-06-24 | End: 2021-07-01

## 2021-06-30 RX ORDER — CITALOPRAM HYDROBROMIDE 10 MG/1
10 TABLET ORAL
COMMUNITY
Start: 2020-11-30 | End: 2023-11-15

## 2021-06-30 RX ORDER — FLUTICASONE PROPIONATE 50 MCG
2 SPRAY, SUSPENSION (ML) NASAL
COMMUNITY
Start: 2021-01-20

## 2021-06-30 RX ORDER — ATORVASTATIN CALCIUM 10 MG/1
10 TABLET, FILM COATED ORAL
COMMUNITY
Start: 2021-03-10 | End: 2023-11-15

## 2021-06-30 RX ORDER — ESTRADIOL 10 UG/1
10 INSERT VAGINAL
COMMUNITY
Start: 2020-10-22 | End: 2023-11-15

## 2021-06-30 ASSESSMENT — REFRACTION_MANIFEST
OS_AXIS: 155
OS_CYLINDER: +1.75
OD_CYLINDER: +1.25
OS_ADD: +3.00
OD_AXIS: 007
OD_SPHERE: PLANO
OD_ADD: +3.00
OS_SPHERE: -0.50

## 2021-06-30 ASSESSMENT — TONOMETRY
OS_IOP_MMHG: 15
OD_IOP_MMHG: 14
IOP_METHOD: APPLANATION

## 2021-06-30 ASSESSMENT — REFRACTION_WEARINGRX
OD_AXIS: 002
OS_SPHERE: -0.25
OD_CYLINDER: +1.25
OS_CYLINDER: +1.75
OD_ADD: +3.00
OS_AXIS: 160
SPECS_TYPE: PAL
OS_ADD: +3.00
OD_SPHERE: PLANO

## 2021-06-30 ASSESSMENT — VISUAL ACUITY
OD_CC+: -2
OS_CC: 20/25
OS_CC+: -1
OD_CC: 20/20
CORRECTION_TYPE: GLASSES
METHOD: SNELLEN - LINEAR

## 2021-06-30 ASSESSMENT — CONF VISUAL FIELD
OS_NORMAL: 1
OD_NORMAL: 1

## 2021-06-30 ASSESSMENT — EXTERNAL EXAM - RIGHT EYE: OD_EXAM: NORMAL

## 2021-06-30 ASSESSMENT — EXTERNAL EXAM - LEFT EYE: OS_EXAM: NORMAL

## 2021-06-30 ASSESSMENT — CUP TO DISC RATIO
OD_RATIO: 0.4
OS_RATIO: 0.3

## 2021-06-30 NOTE — PATIENT INSTRUCTIONS
"Glasses Rx given - optional.  Use artificial tears up to 4 times daily both eyes. (Refresh Tears, Systane Ultra/Balance, or Theratears).  Take a multiple vitamin or \"eye vitamin\" daily (AREDS2).  Could take daily or twice daily.Protect your eyes outdoors from ultraviolet rays with sunglasses and/or brimmed hat.  Have spinach (cooked or raw), colorful fruits, walnuts, hazelnuts, almonds in your diet.  Monitor the vision in each eye weekly - call if any sudden persistent changes.  Call in February 2022 for an appointment in June 2022 for Complete Exam    Dr. Thao [phone]    "

## 2021-06-30 NOTE — LETTER
"    6/30/2021         RE: Edwardo RYAN  [address] [facility] [address]        Dear Colleague,    Thank you for referring your patient, Edwardo RYAN, to the [facility]. Please see a copy of my visit note below.     Current Eye Medications: Preservision every other day, artificial tears prn both eyes     Subjective:  Here for complete eye exam today. Left eye still has the floater but it looks like a rectangle not a string anymore. Hasn't gone away since last visit.     Objective:  See Ophthalmology Exam.       Assessment:  Recent posterior vitreous detachment left eye now clearly evident on exam; otherwise stable eye exam.      ICD-10-CM    1. Combined forms of age-related cataract, mild, of both eyes  H25.813    2. Early dry stage nonexudative age-related macular degeneration of both eyes  H35.3131    3. MGD (meibomian gland dysfunction)  H02.889    4. Choroidal nevus of right eye  D31.31    5. History of blepharoplasty, upper lids, ou  Z98.890    6. Posterior vitreous detachment of both eyes  H43.813    7. Encounter for examination of eyes and vision with abnormal findings  Z01.01    8. Presbyopia  H52.4         Plan:  Glasses Rx given - optional.  Use artificial tears up to 4 times daily both eyes. (Refresh Tears, Systane Ultra/Balance, or Theratears).  Take a multiple vitamin or \"eye vitamin\" daily (AREDS2).  Could take daily or twice daily.Protect your eyes outdoors from ultraviolet rays with sunglasses and/or brimmed hat.  Have spinach (cooked or raw), colorful fruits, walnuts, hazelnuts, almonds in your diet.  Monitor the vision in each eye weekly - call if any sudden persistent changes.  Call in February 2022 for an appointment in June 2022 for Complete Exam    Dr. Thao [phone]           Again, thank you for allowing me to participate in the care of your patient.        Sincerely,        Kee Thao MD    "

## 2021-06-30 NOTE — PROGRESS NOTES
" Current Eye Medications: Preservision every other day, artificial tears prn both eyes     Subjective:  Here for complete eye exam today. Left eye still has the floater but it looks like a rectangle not a string anymore. Hasn't gone away since last visit.     Objective:  See Ophthalmology Exam.       Assessment:  Recent posterior vitreous detachment left eye now clearly evident on exam; otherwise stable eye exam.      ICD-10-CM    1. Combined forms of age-related cataract, mild, of both eyes  H25.813    2. Early dry stage nonexudative age-related macular degeneration of both eyes  H35.3131    3. MGD (meibomian gland dysfunction)  H02.889    4. Choroidal nevus of right eye  D31.31    5. History of blepharoplasty, upper lids, ou  Z98.890    6. Posterior vitreous detachment of both eyes  H43.813    7. Encounter for examination of eyes and vision with abnormal findings  Z01.01    8. Presbyopia  H52.4         Plan:  Glasses Rx given - optional.  Use artificial tears up to 4 times daily both eyes. (Refresh Tears, Systane Ultra/Balance, or Theratears).  Take a multiple vitamin or \"eye vitamin\" daily (AREDS2).  Could take daily or twice daily.Protect your eyes outdoors from ultraviolet rays with sunglasses and/or brimmed hat.  Have spinach (cooked or raw), colorful fruits, walnuts, hazelnuts, almonds in your diet.  Monitor the vision in each eye weekly - call if any sudden persistent changes.  Call in February 2022 for an appointment in June 2022 for Complete Exam    Dr. Thao [phone]       "

## 2021-07-09 ENCOUNTER — TELEPHONE (OUTPATIENT)
Dept: OPHTHALMOLOGY | Facility: CLINIC | Age: 76
End: 2021-07-09

## 2021-07-09 DIAGNOSIS — H35.3131 EARLY DRY STAGE NONEXUDATIVE AGE-RELATED MACULAR DEGENERATION OF BOTH EYES: Primary | ICD-10-CM

## 2021-07-09 NOTE — TELEPHONE ENCOUNTER
Patient is asking for a referral to see a retinal Specialist. Please return phone call to discuss. Phone:[phone].

## 2021-07-13 NOTE — TELEPHONE ENCOUNTER
Patient called back stating she was waiting for a call back still. She would like a call back to discuss this as soon as possible at: [phone].    Thank You,    Jose Kam  Patient Rep

## 2021-07-13 NOTE — TELEPHONE ENCOUNTER
Discussed with patient.  She is interested in a second opinion regarding her age related maculopathy and posterior vitreous detachments.  She will make appointment at [facility].  Referral written.  She will call with appointment time and we can fax last couple visits.  Kee Thao M.D.  [phone]

## 2021-08-02 ENCOUNTER — TELEPHONE (OUTPATIENT)
Dept: OPHTHALMOLOGY | Facility: CLINIC | Age: 76
End: 2021-08-02

## 2021-08-02 NOTE — TELEPHONE ENCOUNTER
Thalia called from [place]. She is asking for visit notes from Dr. Thao's last appointment for Macular Degeneration be faxed. FAX : [phone]. Attn: Thalia

## 2021-08-03 ENCOUNTER — TELEPHONE (OUTPATIENT)
Dept: OPHTHALMOLOGY | Facility: CLINIC | Age: 76
End: 2021-08-03

## 2021-08-17 ENCOUNTER — DOCUMENTATION ONLY (OUTPATIENT)
Dept: OPHTHALMOLOGY | Facility: CLINIC | Age: 76
End: 2021-08-17

## 2022-02-18 ENCOUNTER — TELEPHONE (OUTPATIENT)
Dept: OPHTHALMOLOGY | Facility: CLINIC | Age: 77
End: 2022-02-18
Payer: MEDICARE

## 2022-02-18 NOTE — TELEPHONE ENCOUNTER
Patient called stating she is experiencing blurred vision and is wondering what could be the cause of this. She is waiting to be booked for cataract surgery and is out of town in [place]. She would like to speak to a technician to see what she should do. She would like a call back as soon as possible at: [phone] when able.    Thank You,    Jose Kam  Patient Rep

## 2022-02-18 NOTE — TELEPHONE ENCOUNTER
Spoke with pt. Pt complains of intermittent blurry vision in right eye. Started about 2 weeks ago. Occurs mostly in the late afternoon/night. Some mild intermittent pain in right eye. I told pt to use AT's up to four times a day and to do warm compresses 1 - 2 times daily to see if that helps. Pt would like to be seen when she comes home from [place]. I made her an appt 03/03/2022.

## 2022-03-03 ENCOUNTER — OFFICE VISIT (OUTPATIENT)
Dept: OPHTHALMOLOGY | Facility: CLINIC | Age: 77
End: 2022-03-03
Payer: MEDICARE

## 2022-03-03 DIAGNOSIS — H04.123 DRY EYES: Primary | ICD-10-CM

## 2022-03-03 PROCEDURE — 92012 INTRM OPH EXAM EST PATIENT: CPT | Performed by: OPHTHALMOLOGY

## 2022-03-03 RX ORDER — TROSPIUM CHLORIDE 20 MG/1
20 TABLET, FILM COATED ORAL 2 TIMES DAILY
COMMUNITY
Start: 2022-01-14 | End: 2023-01-14

## 2022-03-03 ASSESSMENT — REFRACTION_WEARINGRX
OD_ADD: +2.50
OS_CYLINDER: +1.25
OD_CYLINDER: +1.25
OD_AXIS: 007
OD_SPHERE: PLANO
SPECS_TYPE: PAL
OS_ADD: +2.50
OS_SPHERE: PLANO
OS_AXIS: 156

## 2022-03-03 ASSESSMENT — EXTERNAL EXAM - RIGHT EYE: OD_EXAM: NORMAL

## 2022-03-03 ASSESSMENT — VISUAL ACUITY
OS_BAT_MED: 20/30-2
OD_CC: 20/30
METHOD: SNELLEN - LINEAR
OD_BAT_MED: 20/30-1
OS_BAT_HIGH: 20/40-2
OS_CC+: -1
CORRECTION_TYPE: GLASSES
OD_CC+: -1
OD_BAT_HIGH: 20/50-1
OS_CC: 20/25

## 2022-03-03 ASSESSMENT — EXTERNAL EXAM - LEFT EYE: OS_EXAM: NORMAL

## 2022-03-03 ASSESSMENT — TONOMETRY
OS_IOP_MMHG: 15
OD_IOP_MMHG: 15
IOP_METHOD: APPLANATION

## 2022-03-03 NOTE — PATIENT INSTRUCTIONS
May use artificial tears up to 4 times daily both eyes. (Refresh Tears, Systane Ultra/Balance, or Theratears)   Keep scheduled visit for complete exam.  Kee Thao M.D.  [phone]

## 2022-03-03 NOTE — PROGRESS NOTES
Current Eye Medications:  Systane four times a day both eyes. AREDS 2 twice a day. Used warm compresses and increased drops from three times a day to four times a day after calling us.     Subjective:  Intermittent blurred vision right eye since 1/21/22 while in [place], would improve after sleeping. Vision is OK both eyes. Hasn't had any blurred vision since being home for last 3 days. Still has floater left eye for last year, unchanged. No eye pain or discomfort in either eye.      Objective:  See Ophthalmology Exam.       Assessment:  Possible dry eye symptoms right eye.      Plan:  May use artificial tears up to 4 times daily both eyes. (Refresh Tears, Systane Ultra/Balance, or Theratears)   Keep scheduled visit for complete exam.  Kee Thao M.D.  [phone]

## 2022-03-03 NOTE — LETTER
3/3/2022         RE: Edwardo RYAN  [address] [facility] [address]        Dear Colleague,    Thank you for referring your patient, Edwardo RYAN, to the [facility]. Please see a copy of my visit note below.     Current Eye Medications:  Systane four times a day both eyes. AREDS 2 twice a day. Used warm compresses and increased drops from three times a day to four times a day after calling us.     Subjective:  Intermittent blurred vision right eye since 1/21/22 while in [place], would improve after sleeping. Vision is OK both eyes. Hasn't had any blurred vision since being home for last 3 days. Still has floater left eye for last year, unchanged. No eye pain or discomfort in either eye.      Objective:  See Ophthalmology Exam.       Assessment:  Possible dry eye symptoms right eye.      Plan:  May use artificial tears up to 4 times daily both eyes. (Refresh Tears, Systane Ultra/Balance, or Theratears)   Keep scheduled visit for complete exam.  Kee Thao M.D.  [phone]              Again, thank you for allowing me to participate in the care of your patient.        Sincerely,        Kee Thao MD

## 2022-03-04 PROBLEM — H04.123 DRY EYES: Status: ACTIVE | Noted: 2022-03-04

## 2022-07-06 ENCOUNTER — DOCUMENTATION ONLY (OUTPATIENT)
Dept: OPHTHALMOLOGY | Facility: CLINIC | Age: 77
End: 2022-07-06

## 2022-10-17 ENCOUNTER — OFFICE VISIT (OUTPATIENT)
Dept: OPHTHALMOLOGY | Facility: CLINIC | Age: 77
End: 2022-10-17
Payer: MEDICARE

## 2022-10-17 DIAGNOSIS — D31.31 CHOROIDAL NEVUS OF RIGHT EYE: ICD-10-CM

## 2022-10-17 DIAGNOSIS — H04.123 DRY EYES: ICD-10-CM

## 2022-10-17 DIAGNOSIS — H50.21 HYPERTROPIA OF RIGHT EYE: ICD-10-CM

## 2022-10-17 DIAGNOSIS — H35.3131 EARLY DRY STAGE NONEXUDATIVE AGE-RELATED MACULAR DEGENERATION OF BOTH EYES: ICD-10-CM

## 2022-10-17 DIAGNOSIS — Z83.518 FAMILY HISTORY OF MACULAR DEGENERATION: ICD-10-CM

## 2022-10-17 DIAGNOSIS — H43.813 POSTERIOR VITREOUS DETACHMENT OF BOTH EYES: ICD-10-CM

## 2022-10-17 DIAGNOSIS — Z01.01 ENCOUNTER FOR EXAMINATION OF EYES AND VISION WITH ABNORMAL FINDINGS: ICD-10-CM

## 2022-10-17 DIAGNOSIS — H52.4 PRESBYOPIA: ICD-10-CM

## 2022-10-17 DIAGNOSIS — Z96.1 PSEUDOPHAKIA: Primary | ICD-10-CM

## 2022-10-17 PROCEDURE — 92015 DETERMINE REFRACTIVE STATE: CPT | Mod: GY | Performed by: OPHTHALMOLOGY

## 2022-10-17 PROCEDURE — 92014 COMPRE OPH EXAM EST PT 1/>: CPT | Performed by: OPHTHALMOLOGY

## 2022-10-17 ASSESSMENT — TONOMETRY
IOP_METHOD: APPLANATION
OS_IOP_MMHG: 14
OD_IOP_MMHG: 14

## 2022-10-17 ASSESSMENT — REFRACTION_WEARINGRX
OD_CYLINDER: +0.75
OD_AXIS: 014
OD_ADD: +3.00
OS_VPRISM: 1 BI
OS_CYLINDER: +2.25
OS_AXIS: 131
OD_VPRISM: 1 BD
OS_ADD: +3.00
OD_SPHERE: -0.50
SPECS_TYPE: PAL
OS_SPHERE: -1.50

## 2022-10-17 ASSESSMENT — EXTERNAL EXAM - LEFT EYE: OS_EXAM: NORMAL

## 2022-10-17 ASSESSMENT — REFRACTION_MANIFEST
OS_CYLINDER: +2.00
OS_ADD: +3.25
OD_SPHERE: -0.75
OS_SPHERE: -1.50
OS_AXIS: 148
OD_AXIS: 020
OD_CYLINDER: +0.50
OD_ADD: +3.25

## 2022-10-17 ASSESSMENT — VISUAL ACUITY
METHOD: SNELLEN - LINEAR
OD_CC+: -1+2
OS_CC: 20/25
OD_CC: 20/30
OS_CC+: +2
CORRECTION_TYPE: GLASSES

## 2022-10-17 ASSESSMENT — CUP TO DISC RATIO
OS_RATIO: 0.3
OD_RATIO: 0.4

## 2022-10-17 ASSESSMENT — CONF VISUAL FIELD
OD_SUPERIOR_NASAL_RESTRICTION: 2
OD_SUPERIOR_TEMPORAL_RESTRICTION: 2
OD_INFERIOR_NASAL_RESTRICTION: 2
OS_INFERIOR_TEMPORAL_RESTRICTION: 0
OS_SUPERIOR_NASAL_RESTRICTION: 0
OS_INFERIOR_NASAL_RESTRICTION: 0
OS_NORMAL: 1
OS_SUPERIOR_TEMPORAL_RESTRICTION: 0
OD_INFERIOR_TEMPORAL_RESTRICTION: 2

## 2022-10-17 ASSESSMENT — REFRACTION_FINALRX: OD_VPRISM: 2 BD

## 2022-10-17 ASSESSMENT — EXTERNAL EXAM - RIGHT EYE: OD_EXAM: NORMAL

## 2022-10-17 NOTE — LETTER
"    10/17/2022         RE: Edwardo RYAN  [address] [facility] [place] [address]        Dear Colleague,    Thank you for referring your patient, Edwardo RYAN, to the [facility]. Please see a copy of my visit note below.     Current Eye Medications:  Artificial tears QID times daily both eyes, AREDS twice daily.     Subjective: here for complete eye exam today. Had cataract surgery in both eyes done in June at [place] [facility]. Now has had two pairs of glasses since and hasn't felt they are right. She went there so they could fix her astigmatism, but they told her they could not. Has a wrinkle on the right eye, went to see Retina regarding this.  Also has AMD     Had cataract surgeries both eyes with Riedel 6/2022.     Objective:  See Ophthalmology Exam.       Assessment:  New baseline eye exam both eyes after cataract surgeries in patient with right hypertropia.      ICD-10-CM    1. Pseudophakia, ou  Z96.1       2. 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 Choroidal nevus of right eye  D31.31       4. Hypertropia of right eye  H50.21       5. Family history of macular degeneration  Z83.518       6. Dry eyes  H04.123       7. Posterior vitreous detachment of both eyes  H43.813       8. Encounter for examination of eyes and vision with abnormal findings  Z01.01       9. Presbyopia  H52.4            Plan:  Glasses prescription given - optional  May use artificial tears up to four times a day (like Refresh Optive, Systane Balance, TheraTears, or generic artificial tears are ok. Avoid \"get the red out\" drops).   Possible clouding of posterior capsule both eyes discussed.   Continue taking a multiple vitamin or \"eye vitamin\" daily (AREDS2).  Protect your eyes outdoors from ultraviolet rays with sunglasses and/or brimmed hat.  Have spinach (cooked or raw), colorful fruits, walnuts, hazelnuts, almonds in your diet.  Monitor " the vision in each eye weekly - call if any sudden persistent changes.   Call in June 2023 for an appointment in October 2023 for Complete Exam    Dr. Thao [phone]          Again, thank you for allowing me to participate in the care of your patient.        Sincerely,        Kee Thao MD

## 2022-10-17 NOTE — PROGRESS NOTES
" Current Eye Medications:  Artificial tears QID times daily both eyes, AREDS twice daily.     Subjective: here for complete eye exam today. Had cataract surgery in both eyes done in June at [place] [facility]. Now has had two pairs of glasses since and hasn't felt they are right. She went there so they could fix her astigmatism, but they told her they could not. Has a wrinkle on the right eye, went to see Retina regarding this.  Also has AMD     Had cataract surgeries both eyes with Riedel 6/2022.     Objective:  See Ophthalmology Exam.       Assessment:  New baseline eye exam both eyes after cataract surgeries in patient with right hypertropia.      ICD-10-CM    1. Pseudophakia, ou  Z96.1       2. 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 Choroidal nevus of right eye  D31.31       4. Hypertropia of right eye  H50.21       5. Family history of macular degeneration  Z83.518       6. Dry eyes  H04.123       7. Posterior vitreous detachment of both eyes  H43.813       8. Encounter for examination of eyes and vision with abnormal findings  Z01.01       9. Presbyopia  H52.4            Plan:  Glasses prescription given - optional  May use artificial tears up to four times a day (like Refresh Optive, Systane Balance, TheraTears, or generic artificial tears are ok. Avoid \"get the red out\" drops).   Possible clouding of posterior capsule both eyes discussed.   Continue taking a multiple vitamin or \"eye vitamin\" daily (AREDS2).  Protect your eyes outdoors from ultraviolet rays with sunglasses and/or brimmed hat.  Have spinach (cooked or raw), colorful fruits, walnuts, hazelnuts, almonds in your diet.  Monitor the vision in each eye weekly - call if any sudden persistent changes.   Call in June 2023 for an appointment in October 2023 for Complete Exam    Dr. Thao [phone]      "

## 2022-10-17 NOTE — PATIENT INSTRUCTIONS
"Glasses prescription given - optional  May use artificial tears up to four times a day (like Refresh Optive, Systane Balance, TheraTears, or generic artificial tears are ok. Avoid \"get the red out\" drops).   Possible clouding of posterior capsule both eyes discussed.   Continue taking a multiple vitamin or \"eye vitamin\" daily (AREDS2).  Protect your eyes outdoors from ultraviolet rays with sunglasses and/or brimmed hat.  Have spinach (cooked or raw), colorful fruits, walnuts, hazelnuts, almonds in your diet.  Monitor the vision in each eye weekly - call if any sudden persistent changes.   Call in June 2023 for an appointment in October 2023 for Complete Exam    Dr. Thao [phone]   "

## 2022-10-18 PROBLEM — Z96.1 PSEUDOPHAKIA: Status: ACTIVE | Noted: 2022-10-18

## 2022-10-18 PROBLEM — H25.813 COMBINED FORMS OF AGE-RELATED CATARACT OF BOTH EYES: Status: RESOLVED | Noted: 2018-06-05 | Resolved: 2022-10-18

## 2022-12-18 NOTE — PATIENT INSTRUCTIONS
"Glasses Rx given - optional.  Take a multiple vitamin or \"eye vitamin\" daily.  Alternate every other day with one AREDS 2.  Protect your eyes outdoors from ultraviolet rays with sunglasses and/or brimmed hat.  Have spinach (cooked or raw), colorful fruits, walnuts, hazelnuts, almonds in your diet.  Monitor the vision in each eye weekly - call if any sudden persistent changes.  Possible posterior vitreous detachment (sudden onset large floater and/or flashing lights) left eye discussed.  Call in February 2019 for an appointment in June 2019 for Complete Exam    Dr. Thao [phone]      " normal...

## 2023-10-10 ENCOUNTER — TRANSFERRED RECORDS (OUTPATIENT)
Dept: HEALTH INFORMATION MANAGEMENT | Facility: CLINIC | Age: 78
End: 2023-10-10
Payer: MEDICARE

## 2023-11-15 ENCOUNTER — OFFICE VISIT (OUTPATIENT)
Dept: OPHTHALMOLOGY | Facility: CLINIC | Age: 78
End: 2023-11-15
Payer: MEDICARE

## 2023-11-15 DIAGNOSIS — H35.3131 EARLY DRY STAGE NONEXUDATIVE AGE-RELATED MACULAR DEGENERATION OF BOTH EYES: ICD-10-CM

## 2023-11-15 DIAGNOSIS — Z01.01 ENCOUNTER FOR EXAMINATION OF EYES AND VISION WITH ABNORMAL FINDINGS: ICD-10-CM

## 2023-11-15 DIAGNOSIS — D31.31 CHOROIDAL NEVUS OF RIGHT EYE: ICD-10-CM

## 2023-11-15 DIAGNOSIS — Z96.1 PSEUDOPHAKIA: Primary | ICD-10-CM

## 2023-11-15 DIAGNOSIS — H50.21 HYPERTROPIA OF RIGHT EYE: ICD-10-CM

## 2023-11-15 DIAGNOSIS — H43.813 POSTERIOR VITREOUS DETACHMENT OF BOTH EYES: ICD-10-CM

## 2023-11-15 DIAGNOSIS — Z98.890 HISTORY OF VITRECTOMY: ICD-10-CM

## 2023-11-15 DIAGNOSIS — H52.4 PRESBYOPIA: ICD-10-CM

## 2023-11-15 PROCEDURE — 92014 COMPRE OPH EXAM EST PT 1/>: CPT | Performed by: OPHTHALMOLOGY

## 2023-11-15 PROCEDURE — 92015 DETERMINE REFRACTIVE STATE: CPT | Mod: GY | Performed by: OPHTHALMOLOGY

## 2023-11-15 RX ORDER — HYDROCORTISONE ACETATE 0.5 %
1 CREAM (GRAM) TOPICAL DAILY
COMMUNITY
Start: 2021-12-06

## 2023-11-15 RX ORDER — ACETAMINOPHEN 500 MG
500 TABLET ORAL
COMMUNITY

## 2023-11-15 ASSESSMENT — REFRACTION_WEARINGRX
OD_SPHERE: -0.75
OS_SPHERE: -1.25
OD_CYLINDER: +0.50
OD_VPRISM: 2BD
OD_AXIS: 019
OS_ADD: +3.25
OS_AXIS: 148
OD_ADD: +3.25
OS_CYLINDER: +2.00
SPECS_TYPE: PAL

## 2023-11-15 ASSESSMENT — REFRACTION_MANIFEST
OD_CYLINDER: +0.75
OS_ADD: +3.00
OS_SPHERE: -1.00
OD_ADD: +3.00
OD_SPHERE: -0.75
OS_AXIS: 148
OD_AXIS: 028
OS_CYLINDER: +2.00

## 2023-11-15 ASSESSMENT — CONF VISUAL FIELD
OS_INFERIOR_NASAL_RESTRICTION: 0
OS_INFERIOR_TEMPORAL_RESTRICTION: 0
OS_NORMAL: 1
OD_INFERIOR_NASAL_RESTRICTION: 0
OS_SUPERIOR_TEMPORAL_RESTRICTION: 0
OD_SUPERIOR_NASAL_RESTRICTION: 0
OD_INFERIOR_TEMPORAL_RESTRICTION: 0
OS_SUPERIOR_NASAL_RESTRICTION: 0
OD_NORMAL: 1
OD_SUPERIOR_TEMPORAL_RESTRICTION: 0

## 2023-11-15 ASSESSMENT — VISUAL ACUITY
OS_CC: 20/20
OD_CC: 20/30
CORRECTION_TYPE: GLASSES
OS_CC: J1
OS_CC+: -1
OD_CC: J2
METHOD: SNELLEN - LINEAR

## 2023-11-15 ASSESSMENT — EXTERNAL EXAM - LEFT EYE: OS_EXAM: NORMAL

## 2023-11-15 ASSESSMENT — CUP TO DISC RATIO
OD_RATIO: 0.4
OS_RATIO: 0.3

## 2023-11-15 ASSESSMENT — TONOMETRY
OS_IOP_MMHG: 17
IOP_METHOD: APPLANATION
OD_IOP_MMHG: 19

## 2023-11-15 ASSESSMENT — REFRACTION_FINALRX: OD_VPRISM: 2BD

## 2023-11-15 ASSESSMENT — EXTERNAL EXAM - RIGHT EYE: OD_EXAM: NORMAL

## 2023-11-15 NOTE — PATIENT INSTRUCTIONS
"Glasses prescription given - optional    Possible clouding of posterior capsule both eyes discussed.     Continue to take a multiple vitamin or \"eye vitamin\" daily (AREDS2).  Protect your eyes outdoors from ultraviolet rays with sunglasses and/or brimmed hat.  Have spinach (cooked or raw), colorful fruits, walnuts, hazelnuts, almonds in your diet.  Monitor the vision in each eye weekly - call if any sudden persistent changes.     May use artificial tears up to four times a day (like Refresh Optive, Systane Balance, or TheraTears. Avoid \"get the red out\" drops and generic artifical tears).     Continue care with Dr. Adair.     Call in July 2024 for an appointment in November 2024 for Complete Exam    Dr. Thao [phone]    "

## 2023-11-15 NOTE — LETTER
"    11/15/2023         RE: Edwardo RYAN  [address] [facility] [address]        Dear Colleague,    Thank you for referring your patient, Edwardo RYAN, to the [facility]. Please see a copy of my visit note below.     Current Eye Medications:  systane both eyes four times a day.       Subjective:  Comprehensive Eye Exam.  She feels the prism needs adjusting, or the prescription has changed in her right eye, but vision is good in her left eye.  Amsler grid is less wavy since the laser surgery.    Status/Post retina surgery in September of this year, with Dr Adair.       Objective:  See Ophthalmology Exam.       Assessment:  Doing well s/p recent vitrectomy, epiretinal membrane delamination right eye.  Otherwise stable eye exam.      ICD-10-CM    1. Pseudophakia, ou  Z96.1       2. History of vitrectomy, ERM delam, od  Z98.890       3. Early dry stage nonexudative age-related macular degeneration of both eyes  H35.3131       4. 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 Hypertropia of right eye  H50.21       6. Posterior vitreous detachment of both eyes  H43.813       7. Encounter for examination of eyes and vision with abnormal findings  Z01.01       8. Presbyopia  H52.4            Plan:  Glasses prescription given - optional    Possible clouding of posterior capsule both eyes discussed.     Continue to take a multiple vitamin or \"eye vitamin\" daily (AREDS2).  Protect your eyes outdoors from ultraviolet rays with sunglasses and/or brimmed hat.  Have spinach (cooked or raw), colorful fruits, walnuts, hazelnuts, almonds in your diet.  Monitor the vision in each eye weekly - call if any sudden persistent changes.     May use artificial tears up to four times a day (like Refresh Optive, Systane Balance, or TheraTears. Avoid \"get the red out\" drops and generic artifical tears).     Continue care with Dr. Adair.     Call in July 2024 for an appointment in November " 2024 for Complete Exam    Dr. Thao [phone]         Again, thank you for allowing me to participate in the care of your patient.        Sincerely,        Kee Thao MD

## 2023-12-01 ENCOUNTER — TELEPHONE (OUTPATIENT)
Dept: OPHTHALMOLOGY | Facility: CLINIC | Age: 78
End: 2023-12-01
Payer: MEDICARE

## 2023-12-01 NOTE — TELEPHONE ENCOUNTER
Left message for patient to call back. She was just in to see Dr. Thao. Will have to review with him if she needs another appt or can just be scheduled for a laser.

## 2023-12-01 NOTE — TELEPHONE ENCOUNTER
[facility] Call Center    Phone Message    May a detailed message be left on voicemail: yes     Reason for Call: Other: Patient saw Dr Thao in November and provider informed her that we could do laser procedure on her cloudy eye. Patient would like to know if we're able to have it done at our [place] clinic and when she can some in. Patient is going to [place] 12/29 and would like this done before she leaves. Patient also want to know if it's at a point that it can be done. When she came in it wasn't so bad yet but patient is noticing the difference now.    Please call patient back at [phone]. Thank you.     Action Taken: Message routed to:  Other:  Clinic    Travel Screening: Not Applicable

## 2023-12-04 ENCOUNTER — TELEPHONE (OUTPATIENT)
Dept: OPHTHALMOLOGY | Facility: CLINIC | Age: 78
End: 2023-12-04
Payer: MEDICARE

## 2023-12-04 NOTE — TELEPHONE ENCOUNTER
Called patient and scheduled her for the YAG left eye. Wants to do the left eye first and will have to do the right eye when she gets back into [place]. Made both appointments for her, laser and s/p 1 week laser.

## 2023-12-04 NOTE — TELEPHONE ENCOUNTER
Health Call Center    Phone Message    May a detailed message be left on voicemail: yes     Reason for Call: Other: Pt is returning Keyonna's call and missed it and is requesting her to please call her back. Pt is asking if she can have the surgery by soon due to her trip to [place]. Please contact pt to discuss her options for the surgery. Thank you!      Action Taken: Message routed to:  [facility] eye    Travel Screening: Not Applicable

## 2023-12-19 ENCOUNTER — OFFICE VISIT (OUTPATIENT)
Dept: OPHTHALMOLOGY | Facility: CLINIC | Age: 78
End: 2023-12-19
Payer: MEDICARE

## 2023-12-19 DIAGNOSIS — H35.3131 EARLY DRY STAGE NONEXUDATIVE AGE-RELATED MACULAR DEGENERATION OF BOTH EYES: ICD-10-CM

## 2023-12-19 DIAGNOSIS — Z96.1 PSEUDOPHAKIA: ICD-10-CM

## 2023-12-19 DIAGNOSIS — Z98.890 HISTORY OF VITRECTOMY: ICD-10-CM

## 2023-12-19 DIAGNOSIS — H26.492 POSTERIOR CAPSULAR OPACIFICATION VISUALLY SIGNIFICANT, LEFT EYE: Primary | ICD-10-CM

## 2023-12-19 PROCEDURE — 92012 INTRM OPH EXAM EST PATIENT: CPT | Mod: 25 | Performed by: OPHTHALMOLOGY

## 2023-12-19 PROCEDURE — 66821 AFTER CATARACT LASER SURGERY: CPT | Mod: LT | Performed by: OPHTHALMOLOGY

## 2023-12-19 ASSESSMENT — TONOMETRY
OS_IOP_MMHG: 15
IOP_METHOD: APPLANATION
OD_IOP_MMHG: 15

## 2023-12-19 ASSESSMENT — VISUAL ACUITY
OS_CC+: -3
OD_CC: 20/40
METHOD: SNELLEN - LINEAR
CORRECTION_TYPE: GLASSES
OS_CC: 20/25

## 2023-12-19 ASSESSMENT — EXTERNAL EXAM - LEFT EYE: OS_EXAM: NORMAL

## 2023-12-19 ASSESSMENT — CUP TO DISC RATIO: OS_RATIO: 0.3

## 2023-12-19 ASSESSMENT — EXTERNAL EXAM - RIGHT EYE: OD_EXAM: NORMAL

## 2023-12-19 NOTE — PROGRESS NOTES
" Current Eye Medications:  AREDS2 twice a day. Systane four times a day both eyes.     Subjective:  Yag cap left eye 12/19/23. Patient prefers to have left eye done first. Vision is down both eyes in distance. Gets some double vision right eye since last year. Sometimes right eye feels dry. No eye pain in either eye.   Patient also states \"can't use new glasses from [employer], been using old ones. Explained we would check the Rx at follow up visit from yag.     Objective:  See Ophthalmology Exam.       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      Plan:  Yag Capsulotomy performed without problems left eye under Proparacaine anesthetic.  Well tolerated.  Number of applications: 14  Power of applications: 1.2 mJ  Iopidine given.    Kee Thao M.D.      "

## 2023-12-19 NOTE — LETTER
"    12/19/2023         RE: Edwardo RYAN  [address] [facility] [address]        Dear Colleague,    Thank you for referring your patient, Edwardo RYAN, to the [facility]. Please see a copy of my visit note below.     Current Eye Medications:  AREDS2 twice a day. Systane four times a day both eyes.     Subjective:  Yag cap left eye 12/19/23. Patient prefers to have left eye done first. Vision is down both eyes in distance. Gets some double vision right eye since last year. Sometimes right eye feels dry. No eye pain in either eye.   Patient also states \"can't use new glasses from [employer], been using old ones. Explained we would check the Rx at follow up visit from yag.     Objective:  See Ophthalmology Exam.       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      Plan:  Yag Capsulotomy performed without problems left eye under Proparacaine anesthetic.  Well tolerated.  Number of applications: 14  Power of applications: 1.2 mJ  Iopidine given.    Kee Thao M.D.      Again, thank you for allowing me to participate in the care of your patient.        Sincerely,        Kee Thao MD  "

## 2023-12-19 NOTE — PATIENT INSTRUCTIONS
Your eye may be slightly red, sore, and blurry for a few days.  You may notice some floaters for a few days.    Keep scheduled appointment in about 1 week for S/P YAG Cap left eye - intraocular pressure check and refraction.     Kee Thao M.D.  [phone]

## 2023-12-26 ENCOUNTER — OFFICE VISIT (OUTPATIENT)
Dept: OPHTHALMOLOGY | Facility: CLINIC | Age: 78
End: 2023-12-26
Payer: MEDICARE

## 2023-12-26 DIAGNOSIS — Z96.1 PSEUDOPHAKIA: Primary | ICD-10-CM

## 2023-12-26 PROCEDURE — 99024 POSTOP FOLLOW-UP VISIT: CPT | Performed by: OPHTHALMOLOGY

## 2023-12-26 ASSESSMENT — VISUAL ACUITY
OD_CC+: +2
OS_CC: 20/25
CORRECTION_TYPE: GLASSES
METHOD: SNELLEN - LINEAR
OD_CC: 20/50
OS_CC+: -1

## 2023-12-26 ASSESSMENT — REFRACTION_MANIFEST
OS_AXIS: 146
OS_CYLINDER: +2.25
OS_ADD: +3.00
OS_SPHERE: -1.00

## 2023-12-26 ASSESSMENT — TONOMETRY
OS_IOP_MMHG: 13
IOP_METHOD: APPLANATION

## 2023-12-26 ASSESSMENT — REFRACTION_WEARINGRX
OS_SPHERE: -0.75
OD_CYLINDER: +0.75
OS_ADD: +3.00
OS_CYLINDER: +1.75
SPECS_TYPE: PAL
OD_SPHERE: -0.75
OS_AXIS: 151
OD_ADD: +3.00
OD_VPRISM: 2BD
OD_AXIS: 025

## 2023-12-26 ASSESSMENT — EXTERNAL EXAM - RIGHT EYE: OD_EXAM: NORMAL

## 2023-12-26 ASSESSMENT — EXTERNAL EXAM - LEFT EYE: OS_EXAM: NORMAL

## 2023-12-26 ASSESSMENT — REFRACTION_FINALRX: OD_VPRISM: 2BD

## 2023-12-26 NOTE — PATIENT INSTRUCTIONS
"Glasses prescription given - optional    May use artificial tears up to four times a day (like Refresh Optive, Systane Balance, or TheraTears. Avoid \"get the red out\" drops and generic artifical tears).     Continue to take a multiple vitamin or \"eye vitamin\" daily (AREDS2).  Protect your eyes outdoors from ultraviolet rays with sunglasses and/or brimmed hat.  Have spinach (cooked or raw), colorful fruits, walnuts, hazelnuts, almonds in your diet.  Monitor the vision in each eye weekly - call if any sudden persistent changes.    Call in July 2024 for an appointment in November 2024 for Complete Exam    Dr. Thao [phone]    "

## 2023-12-26 NOTE — PROGRESS NOTES
" Current Eye Medications:  systane      Subjective:  Status/Post Yag Capsulotomy left eye:  12-19-23.  Vision is clearer since the laser.  No problems or concerns.     Hold right eye for now; not yet visually significant.      Objective:  See Ophthalmology Exam.       Assessment: Doing well s/p Yag Caps left eye.      Plan:  Glasses prescription given - optional    May use artificial tears up to four times a day (like Refresh Optive, Systane Balance, or TheraTears. Avoid \"get the red out\" drops and generic artifical tears).     Continue to take a multiple vitamin or \"eye vitamin\" daily (AREDS2).  Protect your eyes outdoors from ultraviolet rays with sunglasses and/or brimmed hat.  Have spinach (cooked or raw), colorful fruits, walnuts, hazelnuts, almonds in your diet.  Monitor the vision in each eye weekly - call if any sudden persistent changes.    Call in July 2024 for an appointment in November 2024 for Complete Exam    Dr. Thao [phone]    "

## 2023-12-26 NOTE — LETTER
"    12/26/2023         RE: Edwardo RYAN  [address] [facility] ([place], [place]) [address]        Dear Colleague,    Thank you for referring your patient, Edwardo RYAN, to the [facility]. Please see a copy of my visit note below.     Current Eye Medications:  systane      Subjective:  Status/Post Yag Capsulotomy left eye:  12-19-23.  Vision is clearer since the laser.  No problems or concerns.     Hold right eye for now; not yet visually significant.      Objective:  See Ophthalmology Exam.       Assessment: Doing well s/p Yag Caps left eye.      Plan:  Glasses prescription given - optional    May use artificial tears up to four times a day (like Refresh Optive, Systane Balance, or TheraTears. Avoid \"get the red out\" drops and generic artifical tears).     Continue to take a multiple vitamin or \"eye vitamin\" daily (AREDS2).  Protect your eyes outdoors from ultraviolet rays with sunglasses and/or brimmed hat.  Have spinach (cooked or raw), colorful fruits, walnuts, hazelnuts, almonds in your diet.  Monitor the vision in each eye weekly - call if any sudden persistent changes.    Call in July 2024 for an appointment in November 2024 for Complete Exam    Dr. Thao [phone]      Again, thank you for allowing me to participate in the care of your patient.        Sincerely,        Kee Thao MD  "

## 2024-01-11 ENCOUNTER — TELEPHONE (OUTPATIENT)
Dept: OPHTHALMOLOGY | Facility: CLINIC | Age: 79
End: 2024-01-11
Payer: MEDICARE

## 2024-01-11 NOTE — TELEPHONE ENCOUNTER
[facility] Call Center    Phone Message    May a detailed message be left on voicemail: yes     Reason for Call: Other: PT CALLED ABOUT VM LEFT FROM [facility]. OK'D ABOUT FAXING FORM WHEN PROVIDER IS IN. VERIFIED FAX NUMBER TO [employer] LOCATED IN [place] [phone]      Action Taken: Other: EYE    Travel Screening: Not Applicable

## 2024-10-03 ENCOUNTER — TELEPHONE (OUTPATIENT)
Dept: OPHTHALMOLOGY | Facility: CLINIC | Age: 79
End: 2024-10-03
Payer: MEDICARE

## 2024-10-03 NOTE — TELEPHONE ENCOUNTER
[facility] Call Center    Phone Message    May a detailed message be left on voicemail: yes     Reason for Call: Symptoms or Concerns     If patient has red-flag symptoms, warm transfer to triage line    Current symptom or concern: per pt left eye is worsening pt is requesting to be seen sooner than November, pt advised the film is worsening    Symptoms have been present for:  2 weeks    Has patient previously been seen for this? Yes    By Dr Thao:     Date: 12/26/23    Are there any new or worsening symptoms? Yes:   Appointment Intake    Referring Provider Name: N/A  Diagnosis and/or Symptoms: per pt left eye is worsening pt is requesting to be seen sooner than November, pt advised the film is worsening  Pt is wanting to be seen soon and not in November. Please contact pt to discuss options     Action Taken: Message routed to:  [facility] eye    Travel Screening: Not Applicable     Date of Service:

## 2024-10-03 NOTE — TELEPHONE ENCOUNTER
Made patient an appt for possible YAG caps right eye and a week later for post op. She says that with the right eye her vision is just blurred and the left eye has always been better. Which has always been the case.  Today saw four guys outside and there was only three.  But then had her check on the phone and no double vision at all.  She says in the end of the conversation she thinks the right eye is more blurred, so we will see her and evaluate.  A bit all over the board with her complaints today, has recently moved and been stressed.  Has been noticing a lot of trouble for the past week now.

## 2024-10-10 ENCOUNTER — OFFICE VISIT (OUTPATIENT)
Dept: OPHTHALMOLOGY | Facility: CLINIC | Age: 79
End: 2024-10-10
Payer: MEDICARE

## 2024-10-10 DIAGNOSIS — Z96.1 PSEUDOPHAKIA: Primary | ICD-10-CM

## 2024-10-10 DIAGNOSIS — H26.491 POSTERIOR CAPSULAR OPACIFICATION NON VISUALLY SIGNIFICANT OF RIGHT EYE: ICD-10-CM

## 2024-10-10 PROCEDURE — 92012 INTRM OPH EXAM EST PATIENT: CPT | Performed by: OPHTHALMOLOGY

## 2024-10-10 ASSESSMENT — REFRACTION_WEARINGRX
OD_CYLINDER: +0.50
OS_AXIS: 145
OD_ADD: +2.50
OD_SPHERE: -0.75
SPECS_TYPE: PAL
OS_CYLINDER: +2.00
OD_AXIS: 014
OD_HPRISM: 2BD
OS_ADD: +2.50
OS_SPHERE: -1.50

## 2024-10-10 ASSESSMENT — EXTERNAL EXAM - RIGHT EYE: OD_EXAM: NORMAL

## 2024-10-10 ASSESSMENT — VISUAL ACUITY
OD_CC: 20/40
CORRECTION_TYPE: GLASSES
METHOD: SNELLEN - LINEAR
OS_CC+: -2
OS_CC: 20/25
OD_CC+: -1/+1

## 2024-10-10 ASSESSMENT — EXTERNAL EXAM - LEFT EYE: OS_EXAM: NORMAL

## 2024-10-10 ASSESSMENT — CUP TO DISC RATIO: OD_RATIO: 0.4

## 2024-10-10 ASSESSMENT — TONOMETRY
OS_IOP_MMHG: 14
IOP_METHOD: APPLANATION
OD_IOP_MMHG: 17

## 2024-10-10 NOTE — PATIENT INSTRUCTIONS
"May use artificial tears up to 4 times daily both eyes. (Refresh Tears, Systane Ultra/Balance, or Theratears)   Take a multiple vitamin or \"eye vitamin\" daily (AREDS2).  Protect your eyes outdoors from ultraviolet rays with sunglasses and/or brimmed hat.  Have spinach (cooked or raw), colorful fruits, walnuts, hazelnuts, almonds in your diet.  Monitor the vision in each eye weekly - call if any sudden persistent changes.   Keep your scheduled appointment in November 2024.    Kee Thao M.D.  [phone]    "

## 2024-10-10 NOTE — LETTER
"10/10/2024      Edwardo RYAN  [address]  [address]  [place] [address]      Dear Colleague,    Thank you for referring your patient, Edwardo RYAN, to the [facility]. Please see a copy of my visit note below.     Current Eye Medications:  Systane both eyes four times per day as needed      Subjective:  Patient has been noticing it is harder to see street signs. History of double vision following cataract surgery and seems to be getting worse over the last 4 weeks. She has been noticing it more when reading or sometimes when looking out of the window. Patient says that most of the time if she closes her right eye the double vision goes away but that doesn't happen all the time.   No changes in floaters, she only has the one in her left eye. No flashing lights. No ocular pain or discomfort other than some dryness.     Objective:  See Ophthalmology Exam.       Assessment:  Posterior capsule opacity right eye not yet visually significant.      Plan:  May use artificial tears up to 4 times daily both eyes. (Refresh Tears, Systane Ultra/Balance, or Theratears)   Take a multiple vitamin or \"eye vitamin\" daily (AREDS2).  Protect your eyes outdoors from ultraviolet rays with sunglasses and/or brimmed hat.  Have spinach (cooked or raw), colorful fruits, walnuts, hazelnuts, almonds in your diet.  Monitor the vision in each eye weekly - call if any sudden persistent changes.   Keep your scheduled appointment in November 2024 - will do retinal OCT and muscle check then, too.    Kee Thao M.D.  [phone]       Again, thank you for allowing me to participate in the care of your patient.        Sincerely,        Kee Thao MD  "

## 2024-10-10 NOTE — PROGRESS NOTES
" Current Eye Medications:  Systane both eyes four times per day as needed      Subjective:  Patient has been noticing it is harder to see street signs. History of double vision following cataract surgery and seems to be getting worse over the last 4 weeks. She has been noticing it more when reading or sometimes when looking out of the window. Patient says that most of the time if she closes her right eye the double vision goes away but that doesn't happen all the time.   No changes in floaters, she only has the one in her left eye. No flashing lights. No ocular pain or discomfort other than some dryness.     Objective:  See Ophthalmology Exam.       Assessment:  Posterior capsule opacity right eye not yet visually significant.      Plan:  May use artificial tears up to 4 times daily both eyes. (Refresh Tears, Systane Ultra/Balance, or Theratears)   Take a multiple vitamin or \"eye vitamin\" daily (AREDS2).  Protect your eyes outdoors from ultraviolet rays with sunglasses and/or brimmed hat.  Have spinach (cooked or raw), colorful fruits, walnuts, hazelnuts, almonds in your diet.  Monitor the vision in each eye weekly - call if any sudden persistent changes.   Keep your scheduled appointment in November 2024 - will do retinal OCT and muscle check then, too.    Kee Thao M.D.  [phone]     "

## 2024-11-26 ENCOUNTER — OFFICE VISIT (OUTPATIENT)
Dept: OPHTHALMOLOGY | Facility: CLINIC | Age: 79
End: 2024-11-26
Payer: MEDICARE

## 2024-11-26 DIAGNOSIS — H50.21 HYPERTROPIA OF RIGHT EYE: ICD-10-CM

## 2024-11-26 DIAGNOSIS — D31.31 CHOROIDAL NEVUS OF RIGHT EYE: ICD-10-CM

## 2024-11-26 DIAGNOSIS — H43.813 POSTERIOR VITREOUS DETACHMENT OF BOTH EYES: ICD-10-CM

## 2024-11-26 DIAGNOSIS — H52.4 PRESBYOPIA: ICD-10-CM

## 2024-11-26 DIAGNOSIS — Z98.890 HISTORY OF VITRECTOMY: ICD-10-CM

## 2024-11-26 DIAGNOSIS — Z01.01 ENCOUNTER FOR EXAMINATION OF EYES AND VISION WITH ABNORMAL FINDINGS: ICD-10-CM

## 2024-11-26 DIAGNOSIS — Z96.1 PSEUDOPHAKIA: Primary | ICD-10-CM

## 2024-11-26 DIAGNOSIS — H35.3131 EARLY DRY STAGE NONEXUDATIVE AGE-RELATED MACULAR DEGENERATION OF BOTH EYES: ICD-10-CM

## 2024-11-26 PROCEDURE — 92014 COMPRE OPH EXAM EST PT 1/>: CPT | Performed by: OPHTHALMOLOGY

## 2024-11-26 PROCEDURE — 92015 DETERMINE REFRACTIVE STATE: CPT | Mod: GY | Performed by: OPHTHALMOLOGY

## 2024-11-26 PROCEDURE — 92134 CPTRZ OPH DX IMG PST SGM RTA: CPT | Performed by: OPHTHALMOLOGY

## 2024-11-26 ASSESSMENT — REFRACTION_MANIFEST
OS_AXIS: 160
OS_ADD: +3.00
OD_AXIS: 018
OS_CYLINDER: +2.00
OD_ADD: +3.00
OD_SPHERE: -1.00
OS_SPHERE: -1.00
OD_CYLINDER: +1.00

## 2024-11-26 ASSESSMENT — CONF VISUAL FIELD
OD_SUPERIOR_NASAL_RESTRICTION: 0
OS_NORMAL: 1
OD_SUPERIOR_TEMPORAL_RESTRICTION: 0
OS_SUPERIOR_TEMPORAL_RESTRICTION: 0
OS_SUPERIOR_NASAL_RESTRICTION: 0
OS_INFERIOR_TEMPORAL_RESTRICTION: 0
OD_INFERIOR_NASAL_RESTRICTION: 0
OD_NORMAL: 1
OD_INFERIOR_TEMPORAL_RESTRICTION: 0
OS_INFERIOR_NASAL_RESTRICTION: 0

## 2024-11-26 ASSESSMENT — REFRACTION_WEARINGRX
OD_SPHERE: -0.50
OS_CYLINDER: +2.00
OS_ADD: +3.25
OS_SPHERE: -1.25
OD_AXIS: 017
OD_ADD: +3.25
OD_CYLINDER: +0.50
OD_VPRISM: 1BD
OS_AXIS: 147

## 2024-11-26 ASSESSMENT — EXTERNAL EXAM - RIGHT EYE: OD_EXAM: NORMAL

## 2024-11-26 ASSESSMENT — CUP TO DISC RATIO
OS_RATIO: 0.3
OD_RATIO: 0.5

## 2024-11-26 ASSESSMENT — VISUAL ACUITY
OD_CC: J5-
METHOD: SNELLEN - LINEAR
OD_CC: 20/70
OD_PH_CC: 20/50
OS_CC+: -1
CORRECTION_TYPE: GLASSES
OS_CC: 20/30
OD_PH_CC+: -2
OD_CC+: +2
OS_CC: J2-

## 2024-11-26 ASSESSMENT — EXTERNAL EXAM - LEFT EYE: OS_EXAM: NORMAL

## 2024-11-26 ASSESSMENT — REFRACTION_FINALRX
OS_VPRISM: 3BU
OD_VPRISM: 2BD

## 2024-11-26 ASSESSMENT — TONOMETRY
OS_IOP_MMHG: 16
IOP_METHOD: APPLANATION
OD_IOP_MMHG: 14

## 2024-11-26 NOTE — PATIENT INSTRUCTIONS
"Glasses prescription given - optional    May use artificial tears up to four times a day (like Refresh Optive, Systane Balance, or TheraTears. Avoid \"get the red out\" drops and generic artifical tears).     Continue to take a multiple vitamin or \"eye vitamin\" daily (AREDS2).  Protect your eyes outdoors from ultraviolet rays with sunglasses and/or brimmed hat.  Have spinach (cooked or raw), colorful fruits, walnuts, hazelnuts, almonds in your diet.  Monitor the vision in each eye weekly - call if any sudden persistent changes.     Possible clouding of posterior capsule right eye discussed.     Call in July 2025 for an appointment in November 2025 for Complete Exam    Dr. Thao [phone]    "

## 2024-11-26 NOTE — PROGRESS NOTES
" Current Eye Medications:  systane both eyes four times a day,  AREDS2 twice a day.      Subjective:  Comprehensive Eye Exam.  The patient complains of complains of the inability of her eyes to focus together, especially when driving.  No specific diplopia, but she feels her vision is blurry.  Both eyes are very dry upon awakening.       Objective:  See Ophthalmology Exam.       Assessment:  Increase in prism correction to neutralize right hypertropia; otherwise stable eye exam.      ICD-10-CM    1. Pseudophakia, ou; Yag Caps, os  Z96.1       2. 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 History of vitrectomy, ERM delam, od (RADNY)  Z98.890       4. 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 Hypertropia of right eye  H50.21       6. Posterior vitreous detachment of both eyes  H43.813       7. Encounter for examination of eyes and vision with abnormal findings  Z01.01       8. Presbyopia  H52.4            Plan:  Glasses prescription given - optional    May use artificial tears up to four times a day (like Refresh Optive, Systane Balance, or TheraTears. Avoid \"get the red out\" drops and generic artifical tears).     Continue to take a multiple vitamin or \"eye vitamin\" daily (AREDS2).  Protect your eyes outdoors from ultraviolet rays with sunglasses and/or brimmed hat.  Have spinach (cooked or raw), colorful fruits, walnuts, hazelnuts, almonds in your diet.  Monitor the vision in each eye weekly - call if any sudden persistent changes.     Possible clouding of posterior capsule right eye discussed.     Okay to hold retina appointment for now.    Call in July 2025 for an appointment in November 2025 for Complete Exam    Dr. Thao [phone]       "

## 2024-11-26 NOTE — LETTER
"11/26/2024      Edwardo RYAN  [address]  [address]  [place] [address]      Dear Colleague,    Thank you for referring your patient, Edwardo RYAN, to the [facility]. Please see a copy of my visit note below.     Current Eye Medications:  systane both eyes four times a day,  AREDS2 twice a day.      Subjective:  Comprehensive Eye Exam.  The patient complains of complains of the inability of her eyes to focus together, especially when driving.  No specific diplopia, but she feels her vision is blurry.  Both eyes are very dry upon awakening.       Objective:  See Ophthalmology Exam.       Assessment:  Increase in prism correction to neutralize right hypertropia; otherwise stable eye exam.      ICD-10-CM    1. Pseudophakia, ou; Yag Caps, os  Z96.1       2. 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 History of vitrectomy, ERM delam, od (RANDY)  Z98.890       4. 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 Hypertropia of right eye  H50.21       6. Posterior vitreous detachment of both eyes  H43.813       7. Encounter for examination of eyes and vision with abnormal findings  Z01.01       8. Presbyopia  H52.4            Plan:  Glasses prescription given - optional    May use artificial tears up to four times a day (like Refresh Optive, Systane Balance, or TheraTears. Avoid \"get the red out\" drops and generic artifical tears).     Continue to take a multiple vitamin or \"eye vitamin\" daily (AREDS2).  Protect your eyes outdoors from ultraviolet rays with sunglasses and/or brimmed hat.  Have spinach (cooked or raw), colorful fruits, walnuts, hazelnuts, almonds in your diet.  Monitor the vision in each eye weekly - call if any sudden persistent changes.     Possible clouding of posterior capsule right eye discussed.     Okay to hold retina appointment for now.    Call in July 2025 for an appointment in November 2025 for Complete " Exam    Dr. Thao [phone]         Again, thank you for allowing me to participate in the care of your patient.        Sincerely,        Kee Thao MD

## 2025-03-26 ENCOUNTER — DOCUMENTATION ONLY (OUTPATIENT)
Dept: OPHTHALMOLOGY | Facility: CLINIC | Age: 80
End: 2025-03-26
Payer: MEDICARE

## 2025-05-09 NOTE — TELEPHONE ENCOUNTER
MTM (medication therapy management) is a service provided by a clinical pharmacist designed to help you get the most of out of your medicines.   Today we reviewed what your medicines are for, how to know if they are working, that your medicines are safe and how to make your medicine regimen as easy as possible.      Patient could benefit from urology referral   Continue taking current medications     Follow up: Due on when needed       Mimi Allen, PharmD     Medication Therapy Management (MTM) Pharmacist     [phone]     [email or web address]     Patient was seen by Dr. Thao in June who referred her to be seen by Retina Specialist. Patient is requesting appointment notes be faxedto R, Dr. Dorman at [facility].   FAX:[phone]. Any questions please call patient at [phone].

## 2025-07-14 ENCOUNTER — OFFICE VISIT (OUTPATIENT)
Dept: OPHTHALMOLOGY | Facility: CLINIC | Age: 80
End: 2025-07-14
Payer: MEDICARE

## 2025-07-14 DIAGNOSIS — D31.31 CHOROIDAL NEVUS OF RIGHT EYE: ICD-10-CM

## 2025-07-14 DIAGNOSIS — Z01.01 ENCOUNTER FOR EXAMINATION OF EYES AND VISION WITH ABNORMAL FINDINGS: ICD-10-CM

## 2025-07-14 DIAGNOSIS — H35.3131 EARLY DRY STAGE NONEXUDATIVE AGE-RELATED MACULAR DEGENERATION OF BOTH EYES: Primary | ICD-10-CM

## 2025-07-14 DIAGNOSIS — Z98.890 HISTORY OF VITRECTOMY: ICD-10-CM

## 2025-07-14 DIAGNOSIS — H52.4 PRESBYOPIA: ICD-10-CM

## 2025-07-14 DIAGNOSIS — H50.21 HYPERTROPIA OF RIGHT EYE: ICD-10-CM

## 2025-07-14 DIAGNOSIS — H43.813 POSTERIOR VITREOUS DETACHMENT OF BOTH EYES: ICD-10-CM

## 2025-07-14 DIAGNOSIS — Z96.1 PSEUDOPHAKIA: ICD-10-CM

## 2025-07-14 PROCEDURE — 92015 DETERMINE REFRACTIVE STATE: CPT | Mod: GY | Performed by: OPHTHALMOLOGY

## 2025-07-14 PROCEDURE — 92014 COMPRE OPH EXAM EST PT 1/>: CPT | Performed by: OPHTHALMOLOGY

## 2025-07-14 ASSESSMENT — VISUAL ACUITY
OS_CC+: -2
OS_CC: 20/30
OD_CC: 20/30
CORRECTION_TYPE: GLASSES
METHOD: SNELLEN - LINEAR
OD_CC+: -2

## 2025-07-14 ASSESSMENT — REFRACTION_FINALRX
OS_VPRISM: 3BU
OD_VPRISM: 2BD

## 2025-07-14 ASSESSMENT — REFRACTION_WEARINGRX
OD_CYLINDER: +1.00
OD_AXIS: 023
OS_AXIS: 172
OS_SPHERE: -0.50
OD_SPHERE: -1.00
OS_CYLINDER: +1.50
OD_ADD: +3.00
OD_VPRISM: 2BD
OS_VPRISM: 2.5BU
OS_ADD: +3.00

## 2025-07-14 ASSESSMENT — REFRACTION_MANIFEST
OS_AXIS: 148
OD_AXIS: 016
OS_CYLINDER: +1.50
OD_ADD: +3.00
OS_ADD: +3.00
OS_SPHERE: -0.75
OD_SPHERE: -1.25
OD_CYLINDER: +1.50

## 2025-07-14 ASSESSMENT — CUP TO DISC RATIO
OD_RATIO: 0.5
OS_RATIO: 0.3

## 2025-07-14 ASSESSMENT — EXTERNAL EXAM - LEFT EYE: OS_EXAM: NORMAL

## 2025-07-14 ASSESSMENT — EXTERNAL EXAM - RIGHT EYE: OD_EXAM: NORMAL

## 2025-07-14 ASSESSMENT — TONOMETRY
OD_IOP_MMHG: 17
OS_IOP_MMHG: 17
IOP_METHOD: APPLANATION

## 2025-07-14 NOTE — PROGRESS NOTES
" Current Eye Medications:  AREDS2.  Systane both eyes four times a day.       Subjective:   The patient complains of some decreasing vision in her left eye, especially when looking in the distance.  She is having difficulty seeing to drive (she does a lot of driving).  She was unable to see the score board at a [employer] game recently.   Diplopia was initially eliminated with her newest glasses, but now she has some vertical diplopia intermittently.   No change in the [place] Grid.    Last appointment with Dr. Villarreal was in May (letter is in Epic).      Objective:  See Ophthalmology Exam.       Assessment:  Stable eye exam.  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      ICD-10-CM    1. Early dry stage nonexudative age-related macular degeneration of both eyes  H35.3131       2. History of vitrectomy, ERM delam, od (RANDY)  Z98.890       3. Pseudophakia, ou; Yag Caps, os  Z96.1       4. 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 Hypertropia of right eye  H50.21       6. Posterior vitreous detachment of both eyes  H43.813       7. Encounter for examination of eyes and vision with abnormal findings  Z01.01       8. Presbyopia  H52.4            Plan:  Glasses Rx given - optional     May use artificial tears up to four times a day (like Refresh Optive, Systane Balance, or TheraTears. Avoid \"get the red out\" drops and generic artifical tears).     Continue to take a multiple vitamin or \"eye vitamin\" daily (AREDS2).  Protect your eyes outdoors from ultraviolet rays with sunglasses and/or brimmed hat.  Have spinach (cooked or raw), colorful fruits, walnuts, hazelnuts, almonds in your diet.  Monitor the vision in each eye weekly - call if any sudden persistent changes.     Possible clouding of posterior capsule right eye discussed.     Continue care with your retina specialist as directed.    Call in February 2026 for an appointment in July 2026 for Complete Exam    Dr. Thao [phone]      "

## 2025-07-14 NOTE — PATIENT INSTRUCTIONS
"Glasses Rx given - optional     May use artificial tears up to four times a day (like Refresh Optive, Systane Balance, or TheraTears. Avoid \"get the red out\" drops and generic artifical tears).     Continue to take a multiple vitamin or \"eye vitamin\" daily (AREDS2).  Protect your eyes outdoors from ultraviolet rays with sunglasses and/or brimmed hat.  Have spinach (cooked or raw), colorful fruits, walnuts, hazelnuts, almonds in your diet.  Monitor the vision in each eye weekly - call if any sudden persistent changes.     Possible clouding of posterior capsule right eye discussed.     Continue care with your retina specialist as directed.    Call in February 2026 for an appointment in July 2026 for Complete Exam    Dr. Thao [phone]   "

## 2025-07-14 NOTE — LETTER
"7/14/2025      Edwardo RYAN  [address]  [address]  [place] [address]      Dear Colleague,    Thank you for referring your patient, Edwardo RYAN, to the [facility]. Please see a copy of my visit note below.     Current Eye Medications:  AREDS2.  Systane both eyes four times a day.       Subjective:   The patient complains of some decreasing vision in her left eye, especially when looking in the distance.  She is having difficulty seeing to drive (she does a lot of driving).  She was unable to see the score board at a [employer] game recently.   Diplopia was initially eliminated with her newest glasses, but now she has some vertical diplopia intermittently.   No change in the Amsler Grid.    Last appointment with Dr. Villarreal was in May (letter is in Epic).      Objective:  See Ophthalmology Exam.       Assessment:  Stable eye exam.  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      ICD-10-CM    1. Early dry stage nonexudative age-related macular degeneration of both eyes  H35.3131       2. History of vitrectomy, ERM delam, od (RANDY)  Z98.890       3. Pseudophakia, ou; Yag Caps, os  Z96.1       4. 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 Hypertropia of right eye  H50.21       6. Posterior vitreous detachment of both eyes  H43.813       7. Encounter for examination of eyes and vision with abnormal findings  Z01.01       8. Presbyopia  H52.4            Plan:  Glasses Rx given - optional     May use artificial tears up to four times a day (like Refresh Optive, Systane Balance, or TheraTears. Avoid \"get the red out\" drops and generic artifical tears).     Continue to take a multiple vitamin or \"eye vitamin\" daily (AREDS2).  Protect your eyes outdoors from ultraviolet rays with sunglasses and/or brimmed hat.  Have spinach (cooked or raw), colorful fruits, walnuts, hazelnuts, almonds in your diet.  Monitor the vision in each eye weekly - call if any sudden " persistent changes.     Possible clouding of posterior capsule right eye discussed.     Continue care with your retina specialist as directed.    Call in February 2026 for an appointment in July 2026 for Complete Exam    Dr. Thao [phone]        Again, thank you for allowing me to participate in the care of your patient.        Sincerely,        Kee Thao MD    Electronically signed